# Patient Record
Sex: MALE | Race: WHITE | Employment: OTHER | URBAN - METROPOLITAN AREA
[De-identification: names, ages, dates, MRNs, and addresses within clinical notes are randomized per-mention and may not be internally consistent; named-entity substitution may affect disease eponyms.]

---

## 2018-06-27 ENCOUNTER — HOSPITAL ENCOUNTER (OUTPATIENT)
Age: 68
Discharge: HOME | End: 2018-06-27
Payer: MEDICARE

## 2018-06-27 LAB
ALBUMIN SERPL-MCNC: 4.2 G/DL (ref 3.5–5.2)
ALP BLD-CCNC: 60 U/L (ref 40–129)
ALT SERPL-CCNC: 57 U/L (ref 0–40)
ANION GAP SERPL CALCULATED.3IONS-SCNC: 13 MMOL/L (ref 7–16)
AST SERPL-CCNC: 35 U/L (ref 0–39)
BILIRUB SERPL-MCNC: 1 MG/DL (ref 0–1.2)
BUN BLDV-MCNC: 14 MG/DL (ref 8–23)
CALCIUM SERPL-MCNC: 9.2 MG/DL (ref 8.6–10.2)
CHLORIDE BLD-SCNC: 101 MMOL/L (ref 98–107)
CO2: 27 MMOL/L (ref 22–29)
CREAT SERPL-MCNC: 0.9 MG/DL (ref 0.7–1.2)
GFR AFRICAN AMERICAN: >60
GFR NON-AFRICAN AMERICAN: >60 ML/MIN/1.73
GLUCOSE FASTING: 139 MG/DL (ref 74–109)
HBA1C MFR BLD: 5.3 % (ref 4–5.6)
POTASSIUM SERPL-SCNC: 4.8 MMOL/L (ref 3.5–5)
PROSTATE SPECIFIC ANTIGEN: 1.47 NG/ML (ref 0–4)
SODIUM BLD-SCNC: 141 MMOL/L (ref 132–146)
TOTAL PROTEIN: 6.8 G/DL (ref 6.4–8.3)

## 2018-06-27 PROCEDURE — 83036 HEMOGLOBIN GLYCOSYLATED A1C: CPT

## 2018-06-27 PROCEDURE — 36415 COLL VENOUS BLD VENIPUNCTURE: CPT

## 2018-06-27 PROCEDURE — G0103 PSA SCREENING: HCPCS

## 2018-06-27 PROCEDURE — 80053 COMPREHEN METABOLIC PANEL: CPT

## 2019-01-24 ENCOUNTER — HOSPITAL ENCOUNTER (OUTPATIENT)
Age: 69
Discharge: HOME | End: 2019-01-24
Payer: MEDICARE

## 2019-01-24 LAB
ALBUMIN SERPL-MCNC: 4.4 G/DL (ref 3.5–5.2)
ALP BLD-CCNC: 67 U/L (ref 40–129)
ALT SERPL-CCNC: 51 U/L (ref 0–40)
ANION GAP SERPL CALCULATED.3IONS-SCNC: 11 MMOL/L (ref 7–16)
AST SERPL-CCNC: 42 U/L (ref 0–39)
BASOPHILS ABSOLUTE: 0.04 E9/L (ref 0–0.2)
BASOPHILS RELATIVE PERCENT: 0.5 % (ref 0–2)
BILIRUB SERPL-MCNC: 1.2 MG/DL (ref 0–1.2)
BUN BLDV-MCNC: 20 MG/DL (ref 8–23)
CALCIUM SERPL-MCNC: 9.1 MG/DL (ref 8.6–10.2)
CHLORIDE BLD-SCNC: 103 MMOL/L (ref 98–107)
CHOLESTEROL, FASTING: 124 MG/DL (ref 0–199)
CO2: 27 MMOL/L (ref 22–29)
CREAT SERPL-MCNC: 0.9 MG/DL (ref 0.7–1.2)
EOSINOPHILS ABSOLUTE: 0.3 E9/L (ref 0.05–0.5)
EOSINOPHILS RELATIVE PERCENT: 4 % (ref 0–6)
GFR AFRICAN AMERICAN: >60
GFR NON-AFRICAN AMERICAN: >60 ML/MIN/1.73
GLUCOSE FASTING: 144 MG/DL (ref 74–99)
HBA1C MFR BLD: 5.3 % (ref 4–5.6)
HCT VFR BLD CALC: 44.5 % (ref 37–54)
HDLC SERPL-MCNC: 32 MG/DL
HEMOGLOBIN: 15.1 G/DL (ref 12.5–16.5)
IMMATURE GRANULOCYTES #: 0.03 E9/L
IMMATURE GRANULOCYTES %: 0.4 % (ref 0–5)
LDL CHOLESTEROL CALCULATED: 67 MG/DL (ref 0–99)
LYMPHOCYTES ABSOLUTE: 1.37 E9/L (ref 1.5–4)
LYMPHOCYTES RELATIVE PERCENT: 18.1 % (ref 20–42)
MCH RBC QN AUTO: 30.7 PG (ref 26–35)
MCHC RBC AUTO-ENTMCNC: 33.9 % (ref 32–34.5)
MCV RBC AUTO: 90.4 FL (ref 80–99.9)
MONOCYTES ABSOLUTE: 0.61 E9/L (ref 0.1–0.95)
MONOCYTES RELATIVE PERCENT: 8 % (ref 2–12)
NEUTROPHILS ABSOLUTE: 5.24 E9/L (ref 1.8–7.3)
NEUTROPHILS RELATIVE PERCENT: 69 % (ref 43–80)
PDW BLD-RTO: 12.6 FL (ref 11.5–15)
PLATELET # BLD: 220 E9/L (ref 130–450)
PMV BLD AUTO: 10.1 FL (ref 7–12)
POTASSIUM SERPL-SCNC: 5.1 MMOL/L (ref 3.5–5)
RBC # BLD: 4.92 E12/L (ref 3.8–5.8)
SODIUM BLD-SCNC: 141 MMOL/L (ref 132–146)
TOTAL PROTEIN: 7.3 G/DL (ref 6.4–8.3)
TRIGLYCERIDE, FASTING: 124 MG/DL (ref 0–149)
TSH SERPL DL<=0.05 MIU/L-ACNC: 1.64 UIU/ML (ref 0.27–4.2)
VLDLC SERPL CALC-MCNC: 25 MG/DL
WBC # BLD: 7.6 E9/L (ref 4.5–11.5)

## 2019-01-24 PROCEDURE — 83036 HEMOGLOBIN GLYCOSYLATED A1C: CPT

## 2019-01-24 PROCEDURE — 80053 COMPREHEN METABOLIC PANEL: CPT

## 2019-01-24 PROCEDURE — 84443 ASSAY THYROID STIM HORMONE: CPT

## 2019-01-24 PROCEDURE — 36415 COLL VENOUS BLD VENIPUNCTURE: CPT

## 2019-01-24 PROCEDURE — 80061 LIPID PANEL: CPT

## 2019-01-24 PROCEDURE — 85025 COMPLETE CBC W/AUTO DIFF WBC: CPT

## 2019-05-07 ENCOUNTER — HOSPITAL ENCOUNTER (EMERGENCY)
Age: 69
Discharge: HOME | End: 2019-05-07
Payer: MEDICARE

## 2019-05-07 VITALS
DIASTOLIC BLOOD PRESSURE: 76 MMHG | BODY MASS INDEX: 31.15 KG/M2 | TEMPERATURE: 98.1 F | WEIGHT: 230 LBS | RESPIRATION RATE: 16 BRPM | OXYGEN SATURATION: 99 % | SYSTOLIC BLOOD PRESSURE: 160 MMHG | HEIGHT: 72 IN | HEART RATE: 71 BPM

## 2019-05-07 DIAGNOSIS — H10.9 CONJUNCTIVITIS OF BOTH EYES, UNSPECIFIED CONJUNCTIVITIS TYPE: Primary | ICD-10-CM

## 2019-05-07 PROCEDURE — 99282 EMERGENCY DEPT VISIT SF MDM: CPT

## 2019-05-07 RX ORDER — POLYMYXIN B SULFATE AND TRIMETHOPRIM 1; 10000 MG/ML; [USP'U]/ML
1 SOLUTION OPHTHALMIC EVERY 4 HOURS
Qty: 1 BOTTLE | Refills: 0 | Status: SHIPPED | OUTPATIENT
Start: 2019-05-07 | End: 2019-05-17

## 2019-05-07 NOTE — ED PROVIDER NOTES
[facility]       Department of Emergency Medicine   ED  Provider Note  Admit Date/RoomTime: 5/7/2019  5:04 PM  ED Room: [place] [address]  Chief Complaint:   Eye Problem (drainage from bilateral eyes today)    History of Present Illness   Source of history provided by:  patient. History/Exam Limitations: none. Sergei Bonilla is a 77 y.o. old male presenting to the emergency department by private vehicle, with gradual onset discharge and erythema to both eyes, which began 10 hour(s) prior to arrival.  Since onset his symptoms have been persistent and mild in severity. Associated signs & symptoms of:  none. The patients tetanus status is unknown. Mechanism:     []  FB Exposure     []  Chemical Exposure     []  Direct Trauma     []  High Speed Machinery Use     []  Welding      Circumstances:    []  Contact Lens Use     []  Recent URI Sx's     []  Spontaneous Onset     []  Close Contact w/similar Sx's     []  Work Related     History of:     []   Glaucoma     []   Recent Eye Surgery     ROS    Pertinent positives and negatives are stated within HPI, all other systems reviewed and are negative. Past Surgical History:  has a past surgical history that includes Cardiac surgery. Social History:  reports that he has never smoked. He has never used smokeless tobacco. He reports that he drinks alcohol. He reports that he does not use drugs. Family History: family history is not on file. Allergies: Patient has no known allergies. Physical Exam           ED Triage Vitals [05/07/19 1703]   BP Temp Temp src Pulse Resp SpO2 Height Weight   (!) 160/76 98.1 °F (36.7 °C) -- 71 16 99 % 6' (1.829 m) 230 lb (104.3 kg)      Oxygen Saturation Interpretation: Normal.    Constitutional:  Alert, development consistent with age. HENT:  NC/NT. Airway patent. Neck:  Normal ROM. Supple. Eyes:         Pupils: equal, round, reactive to light and accommodation.        Eyelids: Bilateral upper and lower Swelling/redness: None.  Scant green thick drainage bilateral eyes. Conjunctiva: Bilateral mild erythema. Sclera: Bilateral mild erythema. Cornea: Bilateral normal and no abnormalities were observed. EOM:  Intact Bilaterally. Fundoscopic:  not well visualized. Visual Acuity:  Within Normal Limit. Integument:  No rashes, erythema present, unless noted elsewhere. Lymphatics: No lymphangitis or adenopathy noted. Neurological:  Oriented. Motor functions intact. Lab / Imaging Results   (All laboratory and radiology results have been personally reviewed by myself)  Labs:  No results found for this visit on 05/07/19. Imaging: All Radiology results interpreted by Radiologist unless otherwise noted. No orders to display       ED Course / Medical Decision Making   Medications - No data to display     MDM:   Patient presented with bilateral erythema and scant green drainage. Patient had no exposure to foreign bodies. He denied pain. His clinical appearance of conjunctivitis and will be initiated on Polytrim. He is instructed to take his medication as prescribed and follow-up with his primary caregiver. Instructed to return to emergency department with any new or worsening symptoms. Counseling: The emergency provider has spoken with the patient and discussed todays results, in addition to providing specific details for the plan of care and counseling regarding the diagnosis and prognosis. Questions are answered at this time and they are agreeable with the plan. Assessment      1.  Conjunctivitis of both eyes, unspecified conjunctivitis type      Plan   Discharge to home  Patient condition is good    New Medications     New Prescriptions    TRIMETHOPRIM-POLYMYXIN B (POLYTRIM) 53873-9.1 UNIT/ML-% OPHTHALMIC SOLUTION    Place 1 drop into both eyes every 4 hours for 10 days     Electronically signed by CHIOMA Cervantes CNP   DD: 5/7/19  **This report was transcribed using voice recognition software. Every effort was made to ensure accuracy; however, inadvertent computerized transcription errors may be present.   END OF ED PROVIDER NOTE       Bigg Patel, CHIOMA - CELIA  05/07/19 5039

## 2019-05-31 ENCOUNTER — HOSPITAL ENCOUNTER (OUTPATIENT)
Age: 69
Discharge: HOME | End: 2019-05-31
Payer: MEDICARE

## 2019-05-31 LAB
ALBUMIN SERPL-MCNC: 4 G/DL (ref 3.5–5.2)
ALP BLD-CCNC: 62 U/L (ref 40–129)
ALT SERPL-CCNC: 35 U/L (ref 0–40)
ANION GAP SERPL CALCULATED.3IONS-SCNC: 9 MMOL/L (ref 7–16)
AST SERPL-CCNC: 22 U/L (ref 0–39)
BASOPHILS ABSOLUTE: 0.05 E9/L (ref 0–0.2)
BASOPHILS RELATIVE PERCENT: 0.7 % (ref 0–2)
BILIRUB SERPL-MCNC: 1 MG/DL (ref 0–1.2)
BUN BLDV-MCNC: 15 MG/DL (ref 8–23)
CALCIUM SERPL-MCNC: 9.2 MG/DL (ref 8.6–10.2)
CHLORIDE BLD-SCNC: 104 MMOL/L (ref 98–107)
CO2: 28 MMOL/L (ref 22–29)
CREAT SERPL-MCNC: 0.9 MG/DL (ref 0.7–1.2)
EOSINOPHILS ABSOLUTE: 0.23 E9/L (ref 0.05–0.5)
EOSINOPHILS RELATIVE PERCENT: 3.2 % (ref 0–6)
GFR AFRICAN AMERICAN: >60
GFR NON-AFRICAN AMERICAN: >60 ML/MIN/1.73
GLUCOSE BLD-MCNC: 157 MG/DL (ref 74–99)
HBA1C MFR BLD: 5.7 % (ref 4–5.6)
HCT VFR BLD CALC: 42.9 % (ref 37–54)
HEMOGLOBIN: 14.7 G/DL (ref 12.5–16.5)
IMMATURE GRANULOCYTES #: 0.02 E9/L
IMMATURE GRANULOCYTES %: 0.3 % (ref 0–5)
LYMPHOCYTES ABSOLUTE: 1.05 E9/L (ref 1.5–4)
LYMPHOCYTES RELATIVE PERCENT: 14.4 % (ref 20–42)
MCH RBC QN AUTO: 30.9 PG (ref 26–35)
MCHC RBC AUTO-ENTMCNC: 34.3 % (ref 32–34.5)
MCV RBC AUTO: 90.3 FL (ref 80–99.9)
MONOCYTES ABSOLUTE: 0.59 E9/L (ref 0.1–0.95)
MONOCYTES RELATIVE PERCENT: 8.1 % (ref 2–12)
NEUTROPHILS ABSOLUTE: 5.36 E9/L (ref 1.8–7.3)
NEUTROPHILS RELATIVE PERCENT: 73.3 % (ref 43–80)
PDW BLD-RTO: 12 FL (ref 11.5–15)
PLATELET # BLD: 209 E9/L (ref 130–450)
PMV BLD AUTO: 10 FL (ref 7–12)
POTASSIUM SERPL-SCNC: 4.7 MMOL/L (ref 3.5–5)
RBC # BLD: 4.75 E12/L (ref 3.8–5.8)
SODIUM BLD-SCNC: 141 MMOL/L (ref 132–146)
TESTOSTERONE TOTAL: 404.2 NG/DL
TOTAL PROTEIN: 6.9 G/DL (ref 6.4–8.3)
WBC # BLD: 7.3 E9/L (ref 4.5–11.5)

## 2019-05-31 PROCEDURE — 80053 COMPREHEN METABOLIC PANEL: CPT

## 2019-05-31 PROCEDURE — 80074 ACUTE HEPATITIS PANEL: CPT

## 2019-05-31 PROCEDURE — 83036 HEMOGLOBIN GLYCOSYLATED A1C: CPT

## 2019-05-31 PROCEDURE — 85025 COMPLETE CBC W/AUTO DIFF WBC: CPT

## 2019-05-31 PROCEDURE — 36415 COLL VENOUS BLD VENIPUNCTURE: CPT

## 2019-05-31 PROCEDURE — 84403 ASSAY OF TOTAL TESTOSTERONE: CPT

## 2019-06-03 LAB
HAV IGM SER IA-ACNC: NORMAL
HEPATITIS B CORE IGM ANTIBODY: NORMAL
HEPATITIS B SURFACE ANTIGEN INTERPRETATION: NORMAL
HEPATITIS C ANTIBODY INTERPRETATION: NORMAL

## 2019-07-29 ENCOUNTER — HOSPITAL ENCOUNTER (OUTPATIENT)
Age: 69
Discharge: HOME | End: 2019-07-29
Payer: MEDICARE

## 2019-07-29 LAB
ALBUMIN SERPL-MCNC: 4.4 G/DL (ref 3.5–5.2)
ALP BLD-CCNC: 57 U/L (ref 40–129)
ALT SERPL-CCNC: 34 U/L (ref 0–40)
ANION GAP SERPL CALCULATED.3IONS-SCNC: 11 MMOL/L (ref 7–16)
AST SERPL-CCNC: 21 U/L (ref 0–39)
BASOPHILS ABSOLUTE: 0.05 E9/L (ref 0–0.2)
BASOPHILS RELATIVE PERCENT: 0.6 % (ref 0–2)
BILIRUB SERPL-MCNC: 1 MG/DL (ref 0–1.2)
BUN BLDV-MCNC: 17 MG/DL (ref 8–23)
CALCIUM SERPL-MCNC: 8.8 MG/DL (ref 8.6–10.2)
CHLORIDE BLD-SCNC: 102 MMOL/L (ref 98–107)
CO2: 26 MMOL/L (ref 22–29)
CREAT SERPL-MCNC: 0.8 MG/DL (ref 0.7–1.2)
EOSINOPHILS ABSOLUTE: 0.17 E9/L (ref 0.05–0.5)
EOSINOPHILS RELATIVE PERCENT: 2 % (ref 0–6)
GFR AFRICAN AMERICAN: >60
GFR NON-AFRICAN AMERICAN: >60 ML/MIN/1.73
GLUCOSE FASTING: 155 MG/DL (ref 74–99)
HBA1C MFR BLD: 5.5 % (ref 4–5.6)
HCT VFR BLD CALC: 43.9 % (ref 37–54)
HEMOGLOBIN: 14.9 G/DL (ref 12.5–16.5)
IMMATURE GRANULOCYTES #: 0.02 E9/L
IMMATURE GRANULOCYTES %: 0.2 % (ref 0–5)
LYMPHOCYTES ABSOLUTE: 1.38 E9/L (ref 1.5–4)
LYMPHOCYTES RELATIVE PERCENT: 16.5 % (ref 20–42)
MCH RBC QN AUTO: 30.7 PG (ref 26–35)
MCHC RBC AUTO-ENTMCNC: 33.9 % (ref 32–34.5)
MCV RBC AUTO: 90.5 FL (ref 80–99.9)
MONOCYTES ABSOLUTE: 0.71 E9/L (ref 0.1–0.95)
MONOCYTES RELATIVE PERCENT: 8.5 % (ref 2–12)
NEUTROPHILS ABSOLUTE: 6.05 E9/L (ref 1.8–7.3)
NEUTROPHILS RELATIVE PERCENT: 72.2 % (ref 43–80)
PDW BLD-RTO: 12.4 FL (ref 11.5–15)
PLATELET # BLD: 213 E9/L (ref 130–450)
PMV BLD AUTO: 10.4 FL (ref 7–12)
POTASSIUM SERPL-SCNC: 4.5 MMOL/L (ref 3.5–5)
PROSTATE SPECIFIC ANTIGEN: 0.86 NG/ML (ref 0–4)
RBC # BLD: 4.85 E12/L (ref 3.8–5.8)
SODIUM BLD-SCNC: 139 MMOL/L (ref 132–146)
TOTAL PROTEIN: 6.8 G/DL (ref 6.4–8.3)
WBC # BLD: 8.4 E9/L (ref 4.5–11.5)

## 2019-07-29 PROCEDURE — 36415 COLL VENOUS BLD VENIPUNCTURE: CPT

## 2019-07-29 PROCEDURE — 85025 COMPLETE CBC W/AUTO DIFF WBC: CPT

## 2019-07-29 PROCEDURE — 83036 HEMOGLOBIN GLYCOSYLATED A1C: CPT

## 2019-07-29 PROCEDURE — 84153 ASSAY OF PSA TOTAL: CPT

## 2019-07-29 PROCEDURE — G0103 PSA SCREENING: HCPCS

## 2019-07-29 PROCEDURE — 80053 COMPREHEN METABOLIC PANEL: CPT

## 2020-09-16 ENCOUNTER — HOSPITAL ENCOUNTER (OUTPATIENT)
Age: 70
Discharge: HOME | End: 2020-09-16
Payer: MEDICARE

## 2020-09-16 LAB
ALBUMIN SERPL-MCNC: 4.2 G/DL (ref 3.5–5.2)
ALP BLD-CCNC: 62 U/L (ref 40–129)
ALT SERPL-CCNC: 51 U/L (ref 0–40)
ANION GAP SERPL CALCULATED.3IONS-SCNC: 10 MMOL/L (ref 7–16)
AST SERPL-CCNC: 32 U/L (ref 0–39)
BACTERIA: NORMAL /HPF
BASOPHILS ABSOLUTE: 0.05 E9/L (ref 0–0.2)
BASOPHILS RELATIVE PERCENT: 0.5 % (ref 0–2)
BILIRUB SERPL-MCNC: 1 MG/DL (ref 0–1.2)
BILIRUBIN URINE: NEGATIVE
BLOOD, URINE: ABNORMAL
BUN BLDV-MCNC: 19 MG/DL (ref 8–23)
CALCIUM SERPL-MCNC: 9.4 MG/DL (ref 8.6–10.2)
CHLORIDE BLD-SCNC: 99 MMOL/L (ref 98–107)
CHOLESTEROL, TOTAL: 149 MG/DL (ref 0–199)
CLARITY: CLEAR
CO2: 28 MMOL/L (ref 22–29)
COLOR: YELLOW
CREAT SERPL-MCNC: 1 MG/DL (ref 0.7–1.2)
CREATININE URINE: 251 MG/DL (ref 40–278)
EOSINOPHILS ABSOLUTE: 0.19 E9/L (ref 0.05–0.5)
EOSINOPHILS RELATIVE PERCENT: 1.8 % (ref 0–6)
GFR AFRICAN AMERICAN: >60
GFR NON-AFRICAN AMERICAN: >60 ML/MIN/1.73
GLUCOSE BLD-MCNC: 163 MG/DL (ref 74–99)
GLUCOSE URINE: NEGATIVE MG/DL
HBA1C MFR BLD: 6.4 % (ref 4–5.6)
HCT VFR BLD CALC: 47.1 % (ref 37–54)
HDLC SERPL-MCNC: 45 MG/DL
HEMOGLOBIN: 15.6 G/DL (ref 12.5–16.5)
IMMATURE GRANULOCYTES #: 0.06 E9/L
IMMATURE GRANULOCYTES %: 0.6 % (ref 0–5)
KETONES, URINE: NEGATIVE MG/DL
LDL CHOLESTEROL CALCULATED: 84 MG/DL (ref 0–99)
LEUKOCYTE ESTERASE, URINE: NEGATIVE
LYMPHOCYTES ABSOLUTE: 1.52 E9/L (ref 1.5–4)
LYMPHOCYTES RELATIVE PERCENT: 14.6 % (ref 20–42)
MCH RBC QN AUTO: 30.8 PG (ref 26–35)
MCHC RBC AUTO-ENTMCNC: 33.1 % (ref 32–34.5)
MCV RBC AUTO: 92.9 FL (ref 80–99.9)
MICROALBUMIN UR-MCNC: <12 MG/L
MICROALBUMIN/CREAT UR-RTO: ABNORMAL (ref 0–30)
MONOCYTES ABSOLUTE: 0.62 E9/L (ref 0.1–0.95)
MONOCYTES RELATIVE PERCENT: 6 % (ref 2–12)
NEUTROPHILS ABSOLUTE: 7.96 E9/L (ref 1.8–7.3)
NEUTROPHILS RELATIVE PERCENT: 76.5 % (ref 43–80)
NITRITE, URINE: NEGATIVE
PDW BLD-RTO: 12.1 FL (ref 11.5–15)
PH UA: 6 (ref 5–9)
PLATELET # BLD: 220 E9/L (ref 130–450)
PMV BLD AUTO: 10.3 FL (ref 7–12)
POTASSIUM SERPL-SCNC: 4.8 MMOL/L (ref 3.5–5)
PROSTATE SPECIFIC ANTIGEN: 1.09 NG/ML (ref 0–4)
PROTEIN UA: NEGATIVE MG/DL
RBC # BLD: 5.07 E12/L (ref 3.8–5.8)
RBC UA: NORMAL /HPF (ref 0–2)
SODIUM BLD-SCNC: 137 MMOL/L (ref 132–146)
SPECIFIC GRAVITY UA: 1.02 (ref 1–1.03)
TOTAL PROTEIN: 6.8 G/DL (ref 6.4–8.3)
TRIGL SERPL-MCNC: 101 MG/DL (ref 0–149)
UROBILINOGEN, URINE: 0.2 E.U./DL
VLDLC SERPL CALC-MCNC: 20 MG/DL
WBC # BLD: 10.4 E9/L (ref 4.5–11.5)
WBC UA: NORMAL /HPF (ref 0–5)

## 2020-09-16 PROCEDURE — 83036 HEMOGLOBIN GLYCOSYLATED A1C: CPT

## 2020-09-16 PROCEDURE — 82570 ASSAY OF URINE CREATININE: CPT

## 2020-09-16 PROCEDURE — G0103 PSA SCREENING: HCPCS

## 2020-09-16 PROCEDURE — 80061 LIPID PANEL: CPT

## 2020-09-16 PROCEDURE — 81001 URINALYSIS AUTO W/SCOPE: CPT

## 2020-09-16 PROCEDURE — 36415 COLL VENOUS BLD VENIPUNCTURE: CPT

## 2020-09-16 PROCEDURE — 82044 UR ALBUMIN SEMIQUANTITATIVE: CPT

## 2020-09-16 PROCEDURE — 80053 COMPREHEN METABOLIC PANEL: CPT

## 2020-09-16 PROCEDURE — 84153 ASSAY OF PSA TOTAL: CPT

## 2020-09-16 PROCEDURE — 85025 COMPLETE CBC W/AUTO DIFF WBC: CPT

## 2021-11-03 ENCOUNTER — APPOINTMENT (OUTPATIENT)
Dept: CT IMAGING | Age: 71
End: 2021-11-03
Payer: MEDICARE

## 2021-11-03 ENCOUNTER — HOSPITAL ENCOUNTER (EMERGENCY)
Age: 71
Discharge: HOME | End: 2021-11-03
Attending: EMERGENCY MEDICINE
Payer: MEDICARE

## 2021-11-03 VITALS
TEMPERATURE: 98.1 F | HEIGHT: 72 IN | BODY MASS INDEX: 29.53 KG/M2 | HEART RATE: 65 BPM | OXYGEN SATURATION: 100 % | SYSTOLIC BLOOD PRESSURE: 179 MMHG | RESPIRATION RATE: 18 BRPM | DIASTOLIC BLOOD PRESSURE: 92 MMHG | WEIGHT: 218 LBS

## 2021-11-03 DIAGNOSIS — R42 ORTHOSTATIC LIGHTHEADEDNESS: Primary | ICD-10-CM

## 2021-11-03 DIAGNOSIS — I10 ESSENTIAL HYPERTENSION: ICD-10-CM

## 2021-11-03 LAB
ANION GAP SERPL CALCULATED.3IONS-SCNC: 12 MMOL/L (ref 7–16)
BUN BLDV-MCNC: 13 MG/DL (ref 6–23)
CALCIUM SERPL-MCNC: 10.2 MG/DL (ref 8.6–10.2)
CHLORIDE BLD-SCNC: 101 MMOL/L (ref 98–107)
CO2: 27 MMOL/L (ref 22–29)
CREAT SERPL-MCNC: 0.7 MG/DL (ref 0.7–1.2)
EKG ATRIAL RATE: 70 BPM
EKG P AXIS: 65 DEGREES
EKG P-R INTERVAL: 186 MS
EKG Q-T INTERVAL: 430 MS
EKG QRS DURATION: 88 MS
EKG QTC CALCULATION (BAZETT): 464 MS
EKG R AXIS: 4 DEGREES
EKG T AXIS: 70 DEGREES
EKG VENTRICULAR RATE: 70 BPM
GFR AFRICAN AMERICAN: >60
GFR NON-AFRICAN AMERICAN: >60 ML/MIN/1.73
GLUCOSE BLD-MCNC: 128 MG/DL (ref 74–99)
HCT VFR BLD CALC: 39.8 % (ref 37–54)
HEMOGLOBIN: 14.1 G/DL (ref 12.5–16.5)
MAGNESIUM: 2 MG/DL (ref 1.6–2.6)
MCH RBC QN AUTO: 32.9 PG (ref 26–35)
MCHC RBC AUTO-ENTMCNC: 35.4 % (ref 32–34.5)
MCV RBC AUTO: 92.8 FL (ref 80–99.9)
PDW BLD-RTO: 12.2 FL (ref 11.5–15)
PLATELET # BLD: 106 E9/L (ref 130–450)
PMV BLD AUTO: 11.8 FL (ref 7–12)
POTASSIUM SERPL-SCNC: 4.7 MMOL/L (ref 3.5–5)
RBC # BLD: 4.29 E12/L (ref 3.8–5.8)
SODIUM BLD-SCNC: 140 MMOL/L (ref 132–146)
TROPONIN, HIGH SENSITIVITY: 13 NG/L (ref 0–11)
TROPONIN, HIGH SENSITIVITY: 16 NG/L (ref 0–11)
WBC # BLD: 8.7 E9/L (ref 4.5–11.5)

## 2021-11-03 PROCEDURE — 85027 COMPLETE CBC AUTOMATED: CPT

## 2021-11-03 PROCEDURE — 84484 ASSAY OF TROPONIN QUANT: CPT

## 2021-11-03 PROCEDURE — 80048 BASIC METABOLIC PNL TOTAL CA: CPT

## 2021-11-03 PROCEDURE — 2500000003 HC RX 250 WO HCPCS: Performed by: EMERGENCY MEDICINE

## 2021-11-03 PROCEDURE — 83735 ASSAY OF MAGNESIUM: CPT

## 2021-11-03 PROCEDURE — 6370000000 HC RX 637 (ALT 250 FOR IP): Performed by: EMERGENCY MEDICINE

## 2021-11-03 PROCEDURE — 93005 ELECTROCARDIOGRAM TRACING: CPT | Performed by: EMERGENCY MEDICINE

## 2021-11-03 PROCEDURE — 96374 THER/PROPH/DIAG INJ IV PUSH: CPT

## 2021-11-03 PROCEDURE — 99285 EMERGENCY DEPT VISIT HI MDM: CPT

## 2021-11-03 PROCEDURE — 36415 COLL VENOUS BLD VENIPUNCTURE: CPT

## 2021-11-03 PROCEDURE — 2580000003 HC RX 258: Performed by: EMERGENCY MEDICINE

## 2021-11-03 PROCEDURE — 96376 TX/PRO/DX INJ SAME DRUG ADON: CPT

## 2021-11-03 PROCEDURE — 71045 X-RAY EXAM CHEST 1 VIEW: CPT

## 2021-11-03 RX ORDER — 0.9 % SODIUM CHLORIDE 0.9 %
1000 INTRAVENOUS SOLUTION INTRAVENOUS ONCE
Status: COMPLETED | OUTPATIENT
Start: 2021-11-03 | End: 2021-11-03

## 2021-11-03 RX ORDER — MORPHINE SULFATE 4 MG/ML
4 INJECTION, SOLUTION INTRAMUSCULAR; INTRAVENOUS ONCE
Status: DISCONTINUED | OUTPATIENT
Start: 2021-11-03 | End: 2021-11-03

## 2021-11-03 RX ORDER — LABETALOL HYDROCHLORIDE 5 MG/ML
10 INJECTION, SOLUTION INTRAVENOUS ONCE
Status: COMPLETED | OUTPATIENT
Start: 2021-11-03 | End: 2021-11-03

## 2021-11-03 RX ORDER — CLONIDINE HYDROCHLORIDE 0.1 MG/1
0.1 TABLET ORAL ONCE
Status: COMPLETED | OUTPATIENT
Start: 2021-11-03 | End: 2021-11-03

## 2021-11-03 RX ORDER — CARVEDILOL 12.5 MG/1
12.5 TABLET ORAL 2 TIMES DAILY WITH MEALS
COMMUNITY

## 2021-11-03 RX ADMIN — LABETALOL HYDROCHLORIDE 10 MG: 5 INJECTION INTRAVENOUS at 15:06

## 2021-11-03 RX ADMIN — LABETALOL HYDROCHLORIDE 10 MG: 5 INJECTION INTRAVENOUS at 14:19

## 2021-11-03 RX ADMIN — CLONIDINE HYDROCHLORIDE 0.1 MG: 0.1 TABLET ORAL at 16:39

## 2021-11-03 RX ADMIN — SODIUM CHLORIDE 1000 ML: 9 INJECTION, SOLUTION INTRAVENOUS at 14:20

## 2021-11-03 ASSESSMENT — PAIN DESCRIPTION - DIRECTION: RADIATING_TOWARDS: HIP

## 2021-11-03 ASSESSMENT — PAIN DESCRIPTION - PAIN TYPE
TYPE: ACUTE PAIN
TYPE: ACUTE PAIN

## 2021-11-03 ASSESSMENT — PAIN DESCRIPTION - LOCATION
LOCATION: BACK
LOCATION: BACK

## 2021-11-03 ASSESSMENT — ENCOUNTER SYMPTOMS
SHORTNESS OF BREATH: 0
BACK PAIN: 1
CHEST TIGHTNESS: 0

## 2021-11-03 ASSESSMENT — PAIN SCALES - GENERAL
PAINLEVEL_OUTOF10: 10
PAINLEVEL_OUTOF10: 0
PAINLEVEL_OUTOF10: 3

## 2021-11-03 ASSESSMENT — PAIN DESCRIPTION - ORIENTATION: ORIENTATION: RIGHT

## 2021-11-03 NOTE — ED PROVIDER NOTES
64-year-old male presenting with concern about lightheadedness when he stands up. He has known high blood pressure and is always struggled to control his blood pressure. 1 week ago he took 100 mg sildenafil. He had very little lightheadedness at that time. Then on Sunday he took 50 mg of sildenafil and since then he said he has been more lightheaded. He does not take blood thinning medication otherwise, is not on any nitroglycerin otherwise. He is on 3 blood pressure medications. This is somewhat gradual onset, persistent, moderate severity, several days duration, he is associating it with the sildenafil usage. Patient also has known right-sided sciatica is being in treatment for for the last several months. This been bothering him as well and thinks that might be part of why his blood pressure is up. History reviewed. No pertinent family history. Past Surgical History:   Procedure Laterality Date    CARDIAC SURGERY         Review of Systems   Constitutional: Negative for fever. Respiratory: Negative for chest tightness and shortness of breath. Musculoskeletal: Positive for back pain. Neurological: Positive for light-headedness. All other systems reviewed and are negative. Physical Exam  Constitutional:       General: He is not in acute distress. Appearance: He is well-developed. HENT:      Head: Normocephalic and atraumatic. Eyes:      Pupils: Pupils are equal, round, and reactive to light. Neck:      Thyroid: No thyromegaly. Cardiovascular:      Rate and Rhythm: Normal rate and regular rhythm. Pulmonary:      Effort: Pulmonary effort is normal. No respiratory distress. Breath sounds: Normal breath sounds. No wheezing. Abdominal:      General: There is no distension. Palpations: Abdomen is soft. There is no mass. Tenderness: There is no abdominal tenderness. There is no guarding or rebound. Musculoskeletal:         General: No tenderness.  Normal range of motion. Cervical back: Normal range of motion and neck supple. Skin:     General: Skin is warm and dry. Findings: No erythema. Neurological:      Mental Status: He is alert and oriented to person, place, and time. Cranial Nerves: No cranial nerve deficit. Procedures     MDM     ED Course as of 11/06/21 1538 Wed Nov 03, 2021   1509 Patient blood pressure still elevated. Will give another dose of labetalol. [SO]   1546 Repeat blood pressure about 195/100 at this point. He is feeling well, is able to get up and walk around. He understands to follow-up with family doctor, cardiologist as well as he needs adjustments of his blood pressure medications. He says that he is under a lot of stress, he has normally good blood pressure readings at home. [SO]      ED Course User Index  [SO] Sandra Scott, DO           ED Course as of 11/06/21 1538 Wed Nov 03, 2021   1509 Patient blood pressure still elevated. Will give another dose of labetalol. [SO]   1546 Repeat blood pressure about 195/100 at this point. He is feeling well, is able to get up and walk around. He understands to follow-up with family doctor, cardiologist as well as he needs adjustments of his blood pressure medications. He says that he is under a lot of stress, he has normally good blood pressure readings at home. [SO]      ED Course User Index  [SO] Sandra Scott, DO       --------------------------------------------- PAST HISTORY ---------------------------------------------  Past Medical History:  has a past medical history of Hyperlipidemia and Hypertension. Past Surgical History:  has a past surgical history that includes Cardiac surgery. Social History:  reports that he has never smoked. He has never used smokeless tobacco. He reports current alcohol use. He reports that he does not use drugs. Family History: family history is not on file.      The patients home medications have been reviewed. Allergies: Patient has no known allergies. -------------------------------------------------- RESULTS -------------------------------------------------  Labs:  Results for orders placed or performed during the hospital encounter of 11/03/21   CBC   Result Value Ref Range    WBC 8.7 4.5 - 11.5 E9/L    RBC 4.29 3.80 - 5.80 E12/L    Hemoglobin 14.1 12.5 - 16.5 g/dL    Hematocrit 39.8 37.0 - 54.0 %    MCV 92.8 80.0 - 99.9 fL    MCH 32.9 26.0 - 35.0 pg    MCHC 35.4 (H) 32.0 - 34.5 %    RDW 12.2 11.5 - 15.0 fL    Platelets 883 (L) 049 - 450 E9/L    MPV 11.8 7.0 - 12.0 fL   Basic metabolic panel   Result Value Ref Range    Sodium 140 132 - 146 mmol/L    Potassium 4.7 3.5 - 5.0 mmol/L    Chloride 101 98 - 107 mmol/L    CO2 27 22 - 29 mmol/L    Anion Gap 12 7 - 16 mmol/L    Glucose 128 (H) 74 - 99 mg/dL    BUN 13 6 - 23 mg/dL    CREATININE 0.7 0.7 - 1.2 mg/dL    GFR Non-African American >60 >=60 mL/min/1.73    GFR African American >60     Calcium 10.2 8.6 - 10.2 mg/dL   Troponin   Result Value Ref Range    Troponin, High Sensitivity 13 (H) 0 - 11 ng/L   Magnesium   Result Value Ref Range    Magnesium 2.0 1.6 - 2.6 mg/dL   Troponin   Result Value Ref Range    Troponin, High Sensitivity 16 (H) 0 - 11 ng/L   EKG 12 Lead   Result Value Ref Range    Ventricular Rate 70 BPM    Atrial Rate 70 BPM    P-R Interval 186 ms    QRS Duration 88 ms    Q-T Interval 430 ms    QTc Calculation (Bazett) 464 ms    P Axis 65 degrees    R Axis 4 degrees    T Axis 70 degrees       Radiology:  XR CHEST PORTABLE   Final Result   No acute process. ------------------------- NURSING NOTES AND VITALS REVIEWED ---------------------------  Date / Time Roomed:  11/3/2021 12:10 PM  ED Bed Assignment:  11/11    The nursing notes within the ED encounter and vital signs as below have been reviewed.    BP (!) 179/92   Pulse 65   Temp 98.1 °F (36.7 °C) (Temporal)   Resp 18   Ht 6' (1.829 m)   Wt 218 lb (98.9 kg)   SpO2 100%   BMI 29.57 kg/m²   Oxygen Saturation Interpretation: Normal      ------------------------------------------ PROGRESS NOTES ------------------------------------------  I have spoken with the patient and discussed todays results, in addition to providing specific details for the plan of care and counseling regarding the diagnosis and prognosis. Their questions are answered at this time and they are agreeable with the plan. I discussed at length with them reasons for immediate return here for re evaluation. They will followup with primary care by calling their office tomorrow. --------------------------------- ADDITIONAL PROVIDER NOTES ---------------------------------  At this time the patient is without objective evidence of an acute process requiring hospitalization or inpatient management. They have remained hemodynamically stable throughout their entire ED visit and are stable for discharge with outpatient follow-up. The plan has been discussed in detail and they are aware of the specific conditions for emergent return, as well as the importance of follow-up. Discharge Medication List as of 11/3/2021  3:48 PM          Diagnosis:  1. Orthostatic lightheadedness    2. Essential hypertension        Disposition:  Patient's disposition: Discharge to home  Patient's condition is stable.          Lalla Osgood, DO  11/06/21 1538

## 2021-11-03 NOTE — ED NOTES
Recent v/s confirmed with MD before d/c process.   Pt received discharge instructions informed to call his cardiologist and primary care physician for follow up appointments     Adolph Sue RN  11/03/21 8991

## 2022-06-13 ENCOUNTER — OFFICE VISIT (OUTPATIENT)
Dept: FAMILY MEDICINE CLINIC | Age: 72
End: 2022-06-13
Payer: MEDICARE

## 2022-06-13 VITALS
HEART RATE: 64 BPM | OXYGEN SATURATION: 98 % | DIASTOLIC BLOOD PRESSURE: 83 MMHG | SYSTOLIC BLOOD PRESSURE: 154 MMHG | RESPIRATION RATE: 17 BRPM | BODY MASS INDEX: 29.53 KG/M2 | HEIGHT: 72 IN | WEIGHT: 218 LBS | TEMPERATURE: 97.4 F

## 2022-06-13 DIAGNOSIS — H66.003 NON-RECURRENT ACUTE SUPPURATIVE OTITIS MEDIA OF BOTH EARS WITHOUT SPONTANEOUS RUPTURE OF TYMPANIC MEMBRANES: Primary | ICD-10-CM

## 2022-06-13 DIAGNOSIS — H61.23 IMPACTED CERUMEN OF BOTH EARS: ICD-10-CM

## 2022-06-13 PROCEDURE — 3017F COLORECTAL CA SCREEN DOC REV: CPT | Performed by: NURSE PRACTITIONER

## 2022-06-13 PROCEDURE — 69210 REMOVE IMPACTED EAR WAX UNI: CPT | Performed by: NURSE PRACTITIONER

## 2022-06-13 PROCEDURE — 99213 OFFICE O/P EST LOW 20 MIN: CPT | Performed by: NURSE PRACTITIONER

## 2022-06-13 PROCEDURE — 1123F ACP DISCUSS/DSCN MKR DOCD: CPT | Performed by: NURSE PRACTITIONER

## 2022-06-13 PROCEDURE — G8427 DOCREV CUR MEDS BY ELIG CLIN: HCPCS | Performed by: NURSE PRACTITIONER

## 2022-06-13 PROCEDURE — 1036F TOBACCO NON-USER: CPT | Performed by: NURSE PRACTITIONER

## 2022-06-13 PROCEDURE — G8417 CALC BMI ABV UP PARAM F/U: HCPCS | Performed by: NURSE PRACTITIONER

## 2022-06-13 RX ORDER — BACITRACIN, NEOMYCIN, POLYMYXIN B 400; 3.5; 5 [USP'U]/G; MG/G; [USP'U]/G
OINTMENT TOPICAL
Status: CANCELLED | OUTPATIENT
Start: 2022-06-13 | End: 2022-06-23

## 2022-06-13 RX ORDER — OFLOXACIN 3 MG/ML
5 SOLUTION AURICULAR (OTIC) 2 TIMES DAILY
Qty: 10 ML | Refills: 0 | Status: SHIPPED | OUTPATIENT
Start: 2022-06-13 | End: 2022-06-16

## 2022-06-13 SDOH — ECONOMIC STABILITY: FOOD INSECURITY: WITHIN THE PAST 12 MONTHS, YOU WORRIED THAT YOUR FOOD WOULD RUN OUT BEFORE YOU GOT MONEY TO BUY MORE.: NEVER TRUE

## 2022-06-13 SDOH — ECONOMIC STABILITY: FOOD INSECURITY: WITHIN THE PAST 12 MONTHS, THE FOOD YOU BOUGHT JUST DIDN'T LAST AND YOU DIDN'T HAVE MONEY TO GET MORE.: NEVER TRUE

## 2022-06-13 ASSESSMENT — PATIENT HEALTH QUESTIONNAIRE - PHQ9
SUM OF ALL RESPONSES TO PHQ QUESTIONS 1-9: 0
SUM OF ALL RESPONSES TO PHQ QUESTIONS 1-9: 0
2. FEELING DOWN, DEPRESSED OR HOPELESS: 0
SUM OF ALL RESPONSES TO PHQ QUESTIONS 1-9: 0
SUM OF ALL RESPONSES TO PHQ QUESTIONS 1-9: 0
1. LITTLE INTEREST OR PLEASURE IN DOING THINGS: 0
SUM OF ALL RESPONSES TO PHQ9 QUESTIONS 1 & 2: 0

## 2022-06-13 ASSESSMENT — SOCIAL DETERMINANTS OF HEALTH (SDOH): HOW HARD IS IT FOR YOU TO PAY FOR THE VERY BASICS LIKE FOOD, HOUSING, MEDICAL CARE, AND HEATING?: NOT HARD AT ALL

## 2022-06-13 NOTE — PROGRESS NOTES
Chief Complaint       Ear Fullness (left ear full cant hear for a few days)    History of Present Illness   Source of history provided by:  patient. Margene Hashimoto is a 74 y.o. old male presenting to the walk in clinic for evaluation of left ear pain and pressure x 3-4 days. Denies associated nasal congestion, rhinorrhea, and sore throat. Denies any discharge from the ear canal. Has tried taking wax removal OTC without relief. Denies any fever, chills, CP, SOB, abdominal pain, neck stiffness, rash, or lethargy. Denies any contact with any individuals with known COVID-19 infection or under investigation for COVID-19 infection. Patient is vaccinated for COVID-19. ROS    Unless otherwise stated in this report or unable to obtain because of the patient's clinical or mental status as evidenced by the medical record, this patients's positive and negative responses for Review of Systems, constitutional, psych, eyes, ENT, cardiovascular, respiratory, gastrointestinal, neurological, genitourinary, musculoskeletal, integument systems and systems related to the presenting problem are either stated in the preceding or were not pertinent or were negative for the symptoms and/or complaints related to the medical problem. Past Medical History:  has a past medical history of Hyperlipidemia and Hypertension. Past Surgical History:  has a past surgical history that includes Cardiac surgery. Social History:  reports that he has never smoked. He has never used smokeless tobacco. He reports current alcohol use. He reports that he does not use drugs. Family History: family history is not on file. Allergies: Patient has no known allergies.     Physical Exam         VS:  BP (!) 154/83   Pulse 64   Temp 97.4 °F (36.3 °C) (Temporal)   Resp 17   Ht 6' (1.829 m)   Wt 218 lb (98.9 kg)   SpO2 98%   BMI 29.57 kg/m²    Oxygen Saturation Interpretation: Normal.    Constitutional:  Alert, development consistent with age.  Ears:  External Ears: Normal pinna bilaterally. TM's & External Canals: impacted cerumen bilaterally; following irrigation canals clear, TMs intact and pearly gray, nasal turbinates erythematous and boggy, pharynx shows erythema and post nasal drip. Airway patient. Neck:  Normal ROM. Supple. No adenopathy. Respiratory:  CTAB without wheezing, rales, or rhonchi  CV: Regular rate and rhythm, normal heart sounds, without pathological murmurs, ectopy, gallops, or rubs. Skin:  Moist and warm without rashes or lesions. Lymphatic: No lymphangitis or adenopathy noted. Neurological:  Oriented. Motor functions intact. Lab / Imaging Results   (All laboratory and radiology results have been personally reviewed by myself)  Labs:  No results found for this visit on 06/13/22. Assessment / Plan     Impression(s):  Allean Epley was seen today for ear fullness. Diagnoses and all orders for this visit:    Non-recurrent acute suppurative otitis media of both ears without spontaneous rupture of tympanic membranes  -     Ear wax removal  -     ofloxacin (FLOXIN) 0.3 % otic solution; Place 5 drops into both ears 2 times daily for 3 days  - Ceruminosis is noted. Wax is removed by syringing and manual debridement. Copious amounts removed, pt tolerated procedure well. TM's visualized, no perforation noted. Instructions for home care to prevent wax buildup are given. Disposition:  Disposition: Discharge to home. Script written, side effects discussed. Increase fluids and rest. Additional symptomatic relief discussed. F/u PCP  if symptoms persist. ED sooner if symptoms worsen or change. ED immediately with fever, severe/worsening ear pain, mastoid redness/tenderness, neck stiffness, CP, dyspnea, or dysphagia. Pt is in agreement with this care plan. All questions answered. Bailee Mckay, APRN - CNP    **This report was transcribed using voice recognition software.  Every effort was made to ensure accuracy; however, inadvertent computerized transcription errors may be present.

## 2022-07-16 ENCOUNTER — HOSPITAL ENCOUNTER (EMERGENCY)
Age: 72
Discharge: HOME | End: 2022-07-16
Payer: MEDICARE

## 2022-07-16 ENCOUNTER — APPOINTMENT (OUTPATIENT)
Dept: ULTRASOUND IMAGING | Age: 72
End: 2022-07-16
Payer: MEDICARE

## 2022-07-16 VITALS
HEIGHT: 72 IN | TEMPERATURE: 97.5 F | DIASTOLIC BLOOD PRESSURE: 70 MMHG | OXYGEN SATURATION: 97 % | BODY MASS INDEX: 28.44 KG/M2 | RESPIRATION RATE: 18 BRPM | SYSTOLIC BLOOD PRESSURE: 118 MMHG | HEART RATE: 78 BPM | WEIGHT: 210 LBS

## 2022-07-16 DIAGNOSIS — T14.8XXA HEMATOMA: Primary | ICD-10-CM

## 2022-07-16 PROCEDURE — 99284 EMERGENCY DEPT VISIT MOD MDM: CPT

## 2022-07-16 PROCEDURE — 93971 EXTREMITY STUDY: CPT

## 2022-07-16 RX ORDER — GABAPENTIN 600 MG/1
600 TABLET ORAL 3 TIMES DAILY
COMMUNITY

## 2022-07-16 RX ORDER — ACETAMINOPHEN 500 MG
1000 TABLET ORAL 3 TIMES DAILY PRN
Qty: 42 TABLET | Refills: 0 | Status: SHIPPED | OUTPATIENT
Start: 2022-07-16 | End: 2022-07-23

## 2022-07-16 ASSESSMENT — LIFESTYLE VARIABLES
HOW OFTEN DO YOU HAVE A DRINK CONTAINING ALCOHOL: 4 OR MORE TIMES A WEEK
HOW MANY STANDARD DRINKS CONTAINING ALCOHOL DO YOU HAVE ON A TYPICAL DAY: 3 OR 4

## 2022-07-16 ASSESSMENT — PAIN DESCRIPTION - LOCATION
LOCATION: ARM
LOCATION: ARM

## 2022-07-16 ASSESSMENT — PAIN DESCRIPTION - ORIENTATION
ORIENTATION: RIGHT
ORIENTATION: RIGHT

## 2022-07-16 ASSESSMENT — PAIN SCALES - GENERAL
PAINLEVEL_OUTOF10: 7
PAINLEVEL_OUTOF10: 4
PAINLEVEL_OUTOF10: 9

## 2022-07-16 ASSESSMENT — PAIN - FUNCTIONAL ASSESSMENT
PAIN_FUNCTIONAL_ASSESSMENT: 0-10

## 2022-07-16 NOTE — Clinical Note
Rory Dorado was seen and treated in our emergency department on 7/16/2022. He may return to work on 07/17/2022. If you have any questions or concerns, please don't hesitate to call.       Oscar Linder PA-C

## 2022-07-16 NOTE — ED PROVIDER NOTES
[facility]     Department of Emergency Medicine   ED  Encounter Note  Admit Date/RoomTime: 2022  7:35 PM  ED Room:     NAME: Aris Lee  : 1950  MRN: [id number]     Chief Complaint:  Arm Injury (Bruising and pain of right arm. Unable to lift arm. Injury occurred 2 days ago)    History of Present Illness       Aris  is a 74 y.o. old male who presents to the emergency department by private vehicle, for traumatic right arm pain which occured 2 day(s) prior to arrival.  The complaint is due to using a marcia and the marcia started to tip over so he jerked the marcia back to it's resting position. Patient has no prior history of pain/injury with regards to today's visit. Since onset the symptoms have been acutely worsening PTA when he was out to eat and tried to grab something out of his pocket. His pain is aggraveated by any movement or pressure on or palpation of painful area and relieved by nothing. He denies any head injury, headache, loss of consciousness, confusion, dizziness, neck pain, chest pain, abdominal pain, back pain, numbness, weakness, blurred vision, nausea, vomiting, fever, chills, wounds, or rash. ROS   Pertinent positives and negatives are stated within HPI, all other systems reviewed and are negative. Past Medical History:  has a past medical history of Hyperlipidemia and Hypertension. Surgical History:  has a past surgical history that includes Cardiac surgery. Social History:  reports that he has never smoked. He has never used smokeless tobacco. He reports current alcohol use. He reports that he does not use drugs. Family History: family history is not on file. Allergies: Patient has no known allergies.     Physical Exam   Oxygen Saturation Interpretation: Normal.        ED Triage Vitals   BP Temp Temp Source Heart Rate Resp SpO2 Height Weight   22 1930 22 primary care provider. Understands that he may require repeat sonography. Carpal tunnel syndrome was considered, however the patient has no numbness or weakness, normal radial pulses and capillary refill distally, and minimal pain. Strict return precautions were discussed and he understands to return immediately for new or worsening symptoms. .    Plan of Care/Counseling:  Marguerite Frankel, PA-C reviewed today's visit with the patient in addition to providing specific details for the plan of care and counseling regarding the diagnosis and prognosis. Questions are answered at this time and are agreeable with the plan. Assessment      1. Hematoma      Plan   Discharged home. Patient condition is good    New Medications     New Prescriptions    ACETAMINOPHEN (TYLENOL) 500 MG TABLET    Take 2 tablets by mouth 3 times daily as needed for Pain or Fever     Electronically signed by Marguerite Frankel, PA-C   DD: 7/16/22  **This report was transcribed using voice recognition software. Every effort was made to ensure accuracy; however, inadvertent computerized transcription errors may be present.   END OF ED PROVIDER NOTE       Marguerite Frankel, PA-C  07/16/22 [id number]

## 2022-07-19 ENCOUNTER — OFFICE VISIT (OUTPATIENT)
Dept: FAMILY MEDICINE CLINIC | Age: 72
End: 2022-07-19
Payer: MEDICARE

## 2022-07-19 VITALS
SYSTOLIC BLOOD PRESSURE: 133 MMHG | TEMPERATURE: 97.2 F | HEART RATE: 63 BPM | RESPIRATION RATE: 17 BRPM | HEIGHT: 72 IN | WEIGHT: 210 LBS | BODY MASS INDEX: 28.44 KG/M2 | OXYGEN SATURATION: 98 % | DIASTOLIC BLOOD PRESSURE: 76 MMHG

## 2022-07-19 DIAGNOSIS — L03.113 CELLULITIS OF RIGHT UPPER EXTREMITY: ICD-10-CM

## 2022-07-19 DIAGNOSIS — S29.011S: ICD-10-CM

## 2022-07-19 DIAGNOSIS — T14.8XXA HEMATOMA: Primary | ICD-10-CM

## 2022-07-19 DIAGNOSIS — R60.1 GENERALIZED EDEMA: ICD-10-CM

## 2022-07-19 PROCEDURE — G8417 CALC BMI ABV UP PARAM F/U: HCPCS | Performed by: NURSE PRACTITIONER

## 2022-07-19 PROCEDURE — 99214 OFFICE O/P EST MOD 30 MIN: CPT | Performed by: NURSE PRACTITIONER

## 2022-07-19 PROCEDURE — G8427 DOCREV CUR MEDS BY ELIG CLIN: HCPCS | Performed by: NURSE PRACTITIONER

## 2022-07-19 PROCEDURE — 3017F COLORECTAL CA SCREEN DOC REV: CPT | Performed by: NURSE PRACTITIONER

## 2022-07-19 PROCEDURE — 1036F TOBACCO NON-USER: CPT | Performed by: NURSE PRACTITIONER

## 2022-07-19 PROCEDURE — 1123F ACP DISCUSS/DSCN MKR DOCD: CPT | Performed by: NURSE PRACTITIONER

## 2022-07-19 RX ORDER — FUROSEMIDE 20 MG/1
20 TABLET ORAL DAILY
Qty: 3 TABLET | Refills: 0 | Status: SHIPPED | OUTPATIENT
Start: 2022-07-19 | End: 2022-10-21

## 2022-07-19 RX ORDER — DOXYCYCLINE HYCLATE 100 MG
100 TABLET ORAL 2 TIMES DAILY
Qty: 14 TABLET | Refills: 0 | Status: SHIPPED | OUTPATIENT
Start: 2022-07-19 | End: 2022-07-26

## 2022-07-19 ASSESSMENT — PATIENT HEALTH QUESTIONNAIRE - PHQ9
SUM OF ALL RESPONSES TO PHQ QUESTIONS 1-9: 0
1. LITTLE INTEREST OR PLEASURE IN DOING THINGS: 0
DEPRESSION UNABLE TO ASSESS: FUNCTIONAL CAPACITY MOTIVATION LIMITS ACCURACY
SUM OF ALL RESPONSES TO PHQ9 QUESTIONS 1 & 2: 0
SUM OF ALL RESPONSES TO PHQ QUESTIONS 1-9: 0
2. FEELING DOWN, DEPRESSED OR HOPELESS: 0
SUM OF ALL RESPONSES TO PHQ QUESTIONS 1-9: 0
SUM OF ALL RESPONSES TO PHQ QUESTIONS 1-9: 0

## 2022-07-19 NOTE — PROGRESS NOTES
Chief Complaint       Arm Injury (Tore bicep in right arm on friday)    History of Present Illness   Source of history provided by:  patient. Marcos Weems is a 74 y.o. old male presenting to the walk in clinic for evaluation of right arm/shoulder pain x 5 days. Reports known injury which he sought emergency care at [facility] hospital . The pain is aggravated by movement and alleviated with rest. Pt states the pain in the shoulder and radiates down the arm or into the neck with excessive swelling. Reports provided no medication or pain reliever. Denies any weakness, paresthesias, neck pain or injury, HA, hand weakness, or associated CP or SOB. Has been taking no at home with minimal symptomatic relief. Imaging concluded hematoma & no DVT. PT does not follow with ortho. Pt takes 81 mg ASA daily. PCP has scheduled US for patient in [facility] though he returns to us today. ROS    Unless otherwise stated in this report or unable to obtain because of the patient's clinical or mental status as evidenced by the medical record, this patients's positive and negative responses for Review of Systems, constitutional, psych, eyes, ENT, cardiovascular, respiratory, gastrointestinal, neurological, genitourinary, musculoskeletal, integument systems and systems related to the presenting problem are either stated in the preceding or were not pertinent or were negative for the symptoms and/or complaints related to the medical problem. Past Medical History:  has a past medical history of Hyperlipidemia and Hypertension. Past Surgical History:  has a past surgical history that includes Cardiac surgery. Social History:  reports that he has never smoked. He has never used smokeless tobacco. He reports current alcohol use. He reports that he does not use drugs. Family History: family history is not on file. Allergies: Patient has no known allergies.     Physical Exam         VS:  /76   Pulse 63   Temp 97.2 °F (36.2 °C) (Temporal)   Resp 17   Ht 6' (1.829 m)   Wt 210 lb (95.3 kg)   SpO2 98%   BMI 28.48 kg/m²    Oxygen Saturation Interpretation: Normal.  Constitutional:  Alert, development consistent with age. Neck:  Normal ROM. Supple. Non-tender. Chest: Heart RRR without pathological murmur or gallop. Lungs CTAB without W/R/R. Shoulder:              Tenderness: TTP over right shoulder without any bony point tenderness. Swelling: No obvious swelling noted. Deformity: No obvious deformity. ROM: Limited ROM due to pain. Increased pain with palpation. UE/ strength 5/5 bilaterally. (-) drop arm test.            Skin:  No abrasions, bruising, or erythema noted. Neurovascular:              Sensory deficit: Sensation intact proximally and distally to the injury site. Pulse deficit: Distal pulses 2+ and bounding. Capillary refill: Less then 2 sec throughout. Elbow:              Tenderness:  Pain with ROM or palpation. Swelling: Moderate warmth & edema noted from elbow to hand. Serious drainage noted. ROM: FROM without deficits. No pain with supination or pronation of the hand. Skin:  No rash, abrasions. Bruising noted. Lymphatics: No lymphangitis or adenopathy noted. Neurological: Alert and oriented. Motor functions intact. Lab / Imaging Results   (All laboratory and radiology results have been personally reviewed by myself)  Labs:  No results found for this visit on 07/19/22. Imaging: All Radiology results interpreted by Radiologist unless otherwise noted. Narrative   EXAMINATION:   DUPLEX ULTRASOUND OF THE RIGHT UPPER EXTREMITY FOR DVT, 7/16/2022 8:12 pm       TECHNIQUE:   Duplex ultrasound using B-mode/gray scaled imaging and Doppler spectral   analysis and color flow was obtained of the deep venous structures of the   upper right extremity. COMPARISON:   None.        HISTORY: flow study and spectral analysis. There is a 6.8 x 4.7 x 4.2 cm complex mass in the axilla. Doppler   demonstrates no evidence of internal flow. Impression   No evidence of DVT. 6.8 x 4.2 cm complex mass in the axilla. This likely represent improvement   previously noted 15.4 x 5.9 x 6.5 cm complex mass, thought to have   represented hematoma. Continued follow-up study recommended to check for   resolution. Assessment / Plan     Impression(s):  Karishma Tello was seen today for arm injury. Diagnoses and all orders for this visit:    Hematoma  -     US DUP UPPER EXTREMITY RIGHT VENOUS; Future    Generalized edema   -     US DUP UPPER EXTREMITY RIGHT VENOUS; Future  -     furosemide (LASIX) 20 MG tablet; Take 1 tablet by mouth in the morning for 3 days. - Tylenol PRN for pain    Cellulitis of right upper extremity  -     doxycycline hyclate (VIBRA-TABS) 100 MG tablet; Take 1 tablet by mouth in the morning and 1 tablet before bedtime. Do all this for 7 days. Disposition:  Disposition: Discharge to imaging. Scripts written, side effects discussed. Advise rest, ice, and/or moist heat for additional symptomatic relief. PCP in 1-2 weeks for recheck if symptoms persist. ED sooner if symptoms worsen or change. ED immediately with severe or worsening pain, paresthesias, weakness, CP, or SOB. Pt states understanding and is in agreement with this care plan. All questions answered. Olesya Jansen, [facility] - CNP    **This report was transcribed using voice recognition software. Every effort was made to ensure accuracy; however, inadvertent computerized transcription errors may be present.

## 2022-10-03 ENCOUNTER — TELEPHONE (OUTPATIENT)
Dept: INTERNAL MEDICINE CLINIC | Age: 72
End: 2022-10-03

## 2022-10-03 DIAGNOSIS — E78.49 OTHER HYPERLIPIDEMIA: Primary | ICD-10-CM

## 2022-10-03 DIAGNOSIS — I10 PRIMARY HYPERTENSION: ICD-10-CM

## 2022-10-03 DIAGNOSIS — I25.10 CORONARY ARTERY DISEASE INVOLVING NATIVE HEART WITHOUT ANGINA PECTORIS, UNSPECIFIED VESSEL OR LESION TYPE: ICD-10-CM

## 2022-10-03 DIAGNOSIS — R73.01 IMPAIRED FASTING GLUCOSE: ICD-10-CM

## 2022-10-03 DIAGNOSIS — Z13.9 SCREENING DUE: ICD-10-CM

## 2022-10-21 ENCOUNTER — HOSPITAL ENCOUNTER (EMERGENCY)
Age: 72
Discharge: HOME | End: 2022-10-21
Attending: EMERGENCY MEDICINE
Payer: MEDICARE

## 2022-10-21 ENCOUNTER — APPOINTMENT (OUTPATIENT)
Dept: GENERAL RADIOLOGY | Age: 72
End: 2022-10-21
Payer: MEDICARE

## 2022-10-21 VITALS
SYSTOLIC BLOOD PRESSURE: 193 MMHG | OXYGEN SATURATION: 98 % | WEIGHT: 206 LBS | TEMPERATURE: 97.5 F | BODY MASS INDEX: 27.9 KG/M2 | HEART RATE: 61 BPM | DIASTOLIC BLOOD PRESSURE: 96 MMHG | HEIGHT: 72 IN | RESPIRATION RATE: 18 BRPM

## 2022-10-21 DIAGNOSIS — M25.511 ACUTE PAIN OF RIGHT SHOULDER: Primary | ICD-10-CM

## 2022-10-21 PROCEDURE — 6360000002 HC RX W HCPCS: Performed by: PHYSICIAN ASSISTANT

## 2022-10-21 PROCEDURE — 96372 THER/PROPH/DIAG INJ SC/IM: CPT

## 2022-10-21 PROCEDURE — 6370000000 HC RX 637 (ALT 250 FOR IP)

## 2022-10-21 PROCEDURE — 73030 X-RAY EXAM OF SHOULDER: CPT

## 2022-10-21 PROCEDURE — 99284 EMERGENCY DEPT VISIT MOD MDM: CPT

## 2022-10-21 RX ORDER — CYCLOBENZAPRINE HCL 10 MG
TABLET ORAL
Status: COMPLETED
Start: 2022-10-21 | End: 2022-10-21

## 2022-10-21 RX ORDER — KETOROLAC TROMETHAMINE 30 MG/ML
30 INJECTION, SOLUTION INTRAMUSCULAR; INTRAVENOUS ONCE
Status: COMPLETED | OUTPATIENT
Start: 2022-10-21 | End: 2022-10-21

## 2022-10-21 RX ORDER — CYCLOBENZAPRINE HCL 10 MG
10 TABLET ORAL ONCE
Status: COMPLETED | OUTPATIENT
Start: 2022-10-21 | End: 2022-10-21

## 2022-10-21 RX ORDER — METHOCARBAMOL 500 MG/1
500 TABLET, FILM COATED ORAL 3 TIMES DAILY PRN
Qty: 20 TABLET | Refills: 0 | Status: SHIPPED | OUTPATIENT
Start: 2022-10-21 | End: 2022-10-31

## 2022-10-21 RX ORDER — HYDROCODONE BITARTRATE AND ACETAMINOPHEN 5; 325 MG/1; MG/1
1 TABLET ORAL EVERY 6 HOURS PRN
Qty: 12 TABLET | Refills: 0 | Status: SHIPPED | OUTPATIENT
Start: 2022-10-21 | End: 2022-10-24

## 2022-10-21 RX ADMIN — KETOROLAC TROMETHAMINE 30 MG: 30 INJECTION, SOLUTION INTRAMUSCULAR at 19:03

## 2022-10-21 RX ADMIN — Medication 10 MG: at 19:36

## 2022-10-21 RX ADMIN — CYCLOBENZAPRINE 10 MG: 10 TABLET, FILM COATED ORAL at 19:36

## 2022-10-21 ASSESSMENT — PAIN DESCRIPTION - ORIENTATION
ORIENTATION: RIGHT
ORIENTATION: RIGHT

## 2022-10-21 ASSESSMENT — PAIN DESCRIPTION - DESCRIPTORS
DESCRIPTORS: ACHING
DESCRIPTORS: ACHING

## 2022-10-21 ASSESSMENT — PAIN - FUNCTIONAL ASSESSMENT
PAIN_FUNCTIONAL_ASSESSMENT: 0-10
PAIN_FUNCTIONAL_ASSESSMENT: PREVENTS OR INTERFERES SOME ACTIVE ACTIVITIES AND ADLS

## 2022-10-21 ASSESSMENT — PAIN SCALES - GENERAL: PAINLEVEL_OUTOF10: 10

## 2022-10-21 ASSESSMENT — PAIN DESCRIPTION - FREQUENCY: FREQUENCY: CONTINUOUS

## 2022-10-21 ASSESSMENT — PAIN DESCRIPTION - LOCATION
LOCATION: SHOULDER
LOCATION: SHOULDER

## 2022-10-21 ASSESSMENT — LIFESTYLE VARIABLES
HOW OFTEN DO YOU HAVE A DRINK CONTAINING ALCOHOL: NEVER
HOW MANY STANDARD DRINKS CONTAINING ALCOHOL DO YOU HAVE ON A TYPICAL DAY: PATIENT DOES NOT DRINK

## 2022-10-21 ASSESSMENT — PAIN DESCRIPTION - ONSET: ONSET: ON-GOING

## 2022-10-21 NOTE — ED PROVIDER NOTES
Independent MLP        HPI:  10/21/22, Time: 6:35 PM EDT         Ethan Bennett is a 74 y.o. male presenting to the ED for right shoulder pain, beginning 1 day ago. The complaint has been persistent, moderate in severity, and worsened by movement of right shoulder. Patient reports that he had an injection in his right shoulder by Dr. Ignacio Barber in [employer] [place] 2 days ago. Reports that the day of the injection he noticed symptom improvement however yesterday and today he noticed the pain was worsening in his right shoulder therefore prompting evaluation today. Reports that the pain is in the anterior aspect of the right shoulder and does not radiate. He has had 2 injections in the shoulder previously, both of which have been successful in improving his pain. Does have a history of a torn biceps muscle on this shoulder for which she follows with orthopedic. Denies any chest pain or shortness of breath. Has a difficult time laying flat to sleep and has therefore been sleeping in the recliner causing his feet to swell slightly. Afebrile without recent travel or sick contacts. Patient denies all other symptoms at this time. Review of Systems:   A complete review of systems was performed and pertinent positives and negatives are stated within HPI, all other systems reviewed and are negative.          --------------------------------------------- PAST HISTORY ---------------------------------------------  Past Medical History:  has a past medical history of Hyperlipidemia and Hypertension. Past Surgical History:  has a past surgical history that includes Cardiac surgery. Social History:  reports that he has never smoked. He has never used smokeless tobacco. He reports current alcohol use. He reports that he does not use drugs. Family History: family history is not on file. The patients home medications have been reviewed.     Allergies: Patient has no known allergies. -------------------------------------------------- RESULTS -------------------------------------------------  All laboratory and radiology results have been personally reviewed by myself   LABS:  No results found for this visit on 10/21/22. RADIOLOGY:  Interpreted by Radiologist.  XR SHOULDER RIGHT (MIN 2 VIEWS)   Final Result   No acute bony abnormalities. Suspect joint effusion.             ------------------------- NURSING NOTES AND VITALS REVIEWED ---------------------------   The nursing notes within the ED encounter and vital signs as below have been reviewed. BP (!) 197/112   Pulse 64   Temp 97.5 °F (36.4 °C)   Resp 16   Ht 6' (1.829 m)   Wt 206 lb (93.4 kg)   SpO2 98%   BMI 27.94 kg/m²   Oxygen Saturation Interpretation: Normal      ---------------------------------------------------PHYSICAL EXAM--------------------------------------      Constitutional/General: Alert and oriented x3, well appearing, non toxic in NAD  Head: Normocephalic and atraumatic  Eyes: PERRL, EOMI  Mouth: Oropharynx clear, handling secretions, no trismus  Neck: Supple, full ROM,   Pulmonary: Lungs clear to auscultation bilaterally, no wheezes, rales, or rhonchi. Not in respiratory distress  Cardiovascular:  Regular rate and rhythm, no murmurs, gallops, or rubs. 2+ distal pulses  Abdomen: Soft, non tender, non distended,   Extremities: Moves all extremities x 4. Warm and well perfused. Tenderness to palpation noted to the anterior aspect of the right shoulder with a soft lump to the anterior aspect just inferior to the [facility] joint. Range of motion of the shoulder limited secondary to pain however does still have full passive range of motion. 2+ radial pulse on the right. Compartments soft in the right upper extremity. 1+ pedal edema bilaterally.     Skin: warm and dry without rash  Neurologic: GCS 15,  Psych: Normal Affect      ------------------------------ ED COURSE/MEDICAL DECISION MAKING----------------------  Medications   ketorolac (TORADOL) injection 30 mg (30 mg IntraMUSCular Given 10/21/22 1903)   cyclobenzaprine (FLEXERIL) tablet 10 mg (10 mg Oral Given 10/21/22 1936)         ED COURSE:       Medical Decision Making:    Patient is a 64-year-old male presenting to the emergency department the right shoulder pain after an injection several days ago. He is nontoxic-appearing, afebrile, no acute distress. No overlying skin changes. Does have full passive range of motion of the right shoulder. Small joint effusion noted on x-ray otherwise no acute pathology. Very low suspicion for a joint infection, I do believe symptomatology is more likely to his joint effusion versus hematoma development. Compartments in the right upper extremity soft and nontender. At this time we will plan on outpatient management with close follow-up with PCP and orthopedic for recheck. Did encourage him to continue moving his right upper extremity as to avoid a DVT. Advised return the emergency department any new or worsening symptoms. Patient and daughter voiced understanding and are agreeable to the above treatment plan. Counseling: The emergency provider has spoken with the family member patient and daughter and discussed todays results, in addition to providing specific details for the plan of care and counseling regarding the diagnosis and prognosis. Questions are answered at this time and they are agreeable with the plan.      --------------------------------- IMPRESSION AND DISPOSITION ---------------------------------    IMPRESSION  1. Acute pain of right shoulder        DISPOSITION  Disposition: Discharge to home  Patient condition is stable      NOTE: This report was transcribed using voice recognition software.  Every effort was made to ensure accuracy; however, inadvertent computerized transcription errors may be present        Darrin Esquivel  10/21/22 1939

## 2022-11-16 ENCOUNTER — HOSPITAL ENCOUNTER (EMERGENCY)
Age: 72
Discharge: HOME | End: 2022-11-16
Attending: EMERGENCY MEDICINE
Payer: MEDICARE

## 2022-11-16 ENCOUNTER — APPOINTMENT (OUTPATIENT)
Dept: GENERAL RADIOLOGY | Age: 72
End: 2022-11-16
Payer: MEDICARE

## 2022-11-16 VITALS
HEART RATE: 62 BPM | DIASTOLIC BLOOD PRESSURE: 88 MMHG | TEMPERATURE: 98.2 F | BODY MASS INDEX: 28.44 KG/M2 | HEIGHT: 72 IN | SYSTOLIC BLOOD PRESSURE: 158 MMHG | WEIGHT: 210 LBS | RESPIRATION RATE: 16 BRPM | OXYGEN SATURATION: 99 %

## 2022-11-16 DIAGNOSIS — M25.511 ACUTE PAIN OF RIGHT SHOULDER: Primary | ICD-10-CM

## 2022-11-16 PROCEDURE — 99284 EMERGENCY DEPT VISIT MOD MDM: CPT

## 2022-11-16 PROCEDURE — 6360000002 HC RX W HCPCS: Performed by: EMERGENCY MEDICINE

## 2022-11-16 PROCEDURE — 96372 THER/PROPH/DIAG INJ SC/IM: CPT

## 2022-11-16 PROCEDURE — 73030 X-RAY EXAM OF SHOULDER: CPT

## 2022-11-16 PROCEDURE — 6370000000 HC RX 637 (ALT 250 FOR IP): Performed by: EMERGENCY MEDICINE

## 2022-11-16 RX ORDER — NAPROXEN 500 MG/1
500 TABLET ORAL 2 TIMES DAILY PRN
Qty: 60 TABLET | Refills: 0 | Status: SHIPPED | OUTPATIENT
Start: 2022-11-16

## 2022-11-16 RX ORDER — CYCLOBENZAPRINE HCL 10 MG
5 TABLET ORAL 3 TIMES DAILY PRN
Qty: 15 TABLET | Refills: 0 | Status: SHIPPED | OUTPATIENT
Start: 2022-11-16 | End: 2022-11-26

## 2022-11-16 RX ORDER — CYCLOBENZAPRINE HCL 10 MG
10 TABLET ORAL ONCE
Status: COMPLETED | OUTPATIENT
Start: 2022-11-16 | End: 2022-11-16

## 2022-11-16 RX ORDER — KETOROLAC TROMETHAMINE 30 MG/ML
30 INJECTION, SOLUTION INTRAMUSCULAR; INTRAVENOUS ONCE
Status: COMPLETED | OUTPATIENT
Start: 2022-11-16 | End: 2022-11-16

## 2022-11-16 RX ADMIN — CYCLOBENZAPRINE 10 MG: 10 TABLET, FILM COATED ORAL at 20:10

## 2022-11-16 RX ADMIN — KETOROLAC TROMETHAMINE 30 MG: 30 INJECTION, SOLUTION INTRAMUSCULAR at 20:10

## 2022-11-16 ASSESSMENT — PAIN DESCRIPTION - PAIN TYPE: TYPE: ACUTE PAIN

## 2022-11-16 ASSESSMENT — LIFESTYLE VARIABLES
HOW MANY STANDARD DRINKS CONTAINING ALCOHOL DO YOU HAVE ON A TYPICAL DAY: PATIENT DOES NOT DRINK
HOW OFTEN DO YOU HAVE A DRINK CONTAINING ALCOHOL: NEVER
HOW OFTEN DO YOU HAVE A DRINK CONTAINING ALCOHOL: NEVER

## 2022-11-16 ASSESSMENT — PAIN DESCRIPTION - ONSET: ONSET: ON-GOING

## 2022-11-16 ASSESSMENT — PAIN DESCRIPTION - FREQUENCY: FREQUENCY: CONTINUOUS

## 2022-11-16 ASSESSMENT — PAIN SCALES - GENERAL: PAINLEVEL_OUTOF10: 10

## 2022-11-16 ASSESSMENT — PAIN - FUNCTIONAL ASSESSMENT: PAIN_FUNCTIONAL_ASSESSMENT: 0-10

## 2022-11-16 ASSESSMENT — PAIN DESCRIPTION - DESCRIPTORS: DESCRIPTORS: ACHING

## 2022-11-16 ASSESSMENT — PAIN DESCRIPTION - LOCATION: LOCATION: SHOULDER

## 2022-11-16 ASSESSMENT — PAIN DESCRIPTION - ORIENTATION: ORIENTATION: RIGHT

## 2022-11-17 NOTE — ED PROVIDER NOTES
HPI:  11/16/22,   Time: 7:16 PM GETACHEW Biggs is a 74 y.o. male presenting to the ED for rt shoulder pain, acute on chronic, beginning hrs ago. The complaint has been persistent, moderate in severity, and worsened by movement of shoulder. Hx chronic shoulder pain, here for same, was doing exercises walking shoulder up wall. Pain after, hurts to move. No numbness/tingling/focal weakness. Bib private vehicle. No deformity/falls    Review of Systems:   Pertinent positives and negatives are stated within HPI, all other systems reviewed and are negative.          --------------------------------------------- PAST HISTORY ---------------------------------------------  Past Medical History:  has a past medical history of Hyperlipidemia and Hypertension. Past Surgical History:  has a past surgical history that includes Cardiac surgery. Social History:  reports that he has never smoked. He has never used smokeless tobacco. He reports that he does not currently use alcohol. He reports that he does not use drugs. Family History: family history is not on file. The patients home medications have been reviewed. Allergies: Patient has no known allergies. ---------------------------------------------------PHYSICAL EXAM--------------------------------------    Constitutional/General: Alert and oriented x3, well appearing, non toxic in NAD  Head: Normocephalic and atraumatic  Eyes: PERRL, EOMI, conjunctive normal, sclera non icteric  Mouth: Oropharynx clear, handling secretions, no trismus, no asymmetry of the posterior oropharynx or uvular edema  Neck: Supple, full ROM,  Respiratory: . Not in respiratory distress  Cardiovascular:  . 2+ distal pulses    . Musculoskeletal: limited rom right shoulder due to pain, diffuse right shoulder pain, no deformity, nvi distal.  Warm and well perfused, no clubbing, cyanosis, or edema. Capillary refill <3 seconds  Integument: skin warm and dry. No rashes. Lymphatic: no lymphadenopathy noted  Neurologic: GCS 15, no focal deficits,   Psychiatric: Normal Affect    -------------------------------------------------- RESULTS -------------------------------------------------  I have personally reviewed all laboratory and imaging results for this patient. Results are listed below. LABS:  No results found for this visit on 11/16/22. RADIOLOGY:  Interpreted by Radiologist.  XR SHOULDER RIGHT (MIN 2 VIEWS)   Final Result   1. Decreased osseous mineralization. No acute fracture about the right   shoulder. Inferior subluxation of the humeral head. 2.  Mild to moderate right shoulder degenerative changes. 3.  Atherosclerotic disease and postsurgical changes over the mediastinum. RECOMMENDATION:   In the setting of recent trauma, if there is persistent symptoms and physical   exam warrants a repeat radiograph in 10-14 days could be considered as occult   fractures may not be evident on initial imaging evaluation. EKG:  This EKG is signed and interpreted by the EP. Time:   Rate:   Rhythm:   Interpretation:   Comparison:       ------------------------- NURSING NOTES AND VITALS REVIEWED ---------------------------   The nursing notes within the ED encounter and vital signs as below have been reviewed by myself. BP (!) 158/88   Pulse 62   Temp 98.2 °F (36.8 °C) (Oral)   Resp 16   Ht 6' (1.829 m)   Wt 210 lb (95.3 kg)   SpO2 99%   BMI 28.48 kg/m²   Oxygen Saturation Interpretation: Normal    The patients available past medical records and past encounters were reviewed.         ------------------------------ ED COURSE/MEDICAL DECISION MAKING----------------------  Medications   ketorolac (TORADOL) injection 30 mg (30 mg IntraMUSCular Given 11/16/22 2010)   cyclobenzaprine (FLEXERIL) tablet 10 mg (10 mg Oral Given 11/16/22 2010)         ED COURSE:       Medical Decision Making:    Results noted, chronic shoulder issues, nml rom, nvi distal, pain controlled, dc with outpt fu. Sublux noted, likely due to muscular atrophy, dc home      This patient's ED course included: a personal history and physicial examination    This patient has remained hemodynamically stable during their ED course. Counseling: The emergency provider has spoken with the patient and family member patient and brother and discussed todays results, in addition to providing specific details for the plan of care and counseling regarding the diagnosis and prognosis. Questions are answered at this time and they are agreeable with the plan.       --------------------------------- IMPRESSION AND DISPOSITION ---------------------------------    IMPRESSION  1. Acute pain of right shoulder        DISPOSITION  Disposition: Discharge to home  Patient condition is stable    NOTE: This report was transcribed using voice recognition software.  Every effort was made to ensure accuracy; however, inadvertent computerized transcription errors may be present        Van Teixeira MD  11/16/22 3511

## 2022-11-28 ENCOUNTER — HOSPITAL ENCOUNTER (EMERGENCY)
Age: 72
Discharge: HOME | End: 2022-11-28
Payer: MEDICARE

## 2022-11-28 VITALS
OXYGEN SATURATION: 99 % | DIASTOLIC BLOOD PRESSURE: 102 MMHG | HEART RATE: 73 BPM | BODY MASS INDEX: 29.16 KG/M2 | WEIGHT: 215 LBS | TEMPERATURE: 97.4 F | SYSTOLIC BLOOD PRESSURE: 178 MMHG | RESPIRATION RATE: 16 BRPM

## 2022-11-28 DIAGNOSIS — M25.511 CHRONIC RIGHT SHOULDER PAIN: Primary | ICD-10-CM

## 2022-11-28 DIAGNOSIS — G89.29 CHRONIC RIGHT SHOULDER PAIN: Primary | ICD-10-CM

## 2022-11-28 PROCEDURE — 96372 THER/PROPH/DIAG INJ SC/IM: CPT

## 2022-11-28 PROCEDURE — 99284 EMERGENCY DEPT VISIT MOD MDM: CPT

## 2022-11-28 PROCEDURE — 6360000002 HC RX W HCPCS: Performed by: PHYSICIAN ASSISTANT

## 2022-11-28 RX ORDER — PREDNISONE 20 MG/1
20 TABLET ORAL 2 TIMES DAILY
Qty: 10 TABLET | Refills: 0 | Status: SHIPPED | OUTPATIENT
Start: 2022-11-28 | End: 2022-12-03

## 2022-11-28 RX ORDER — DEXAMETHASONE SODIUM PHOSPHATE 10 MG/ML
10 INJECTION INTRAMUSCULAR; INTRAVENOUS ONCE
Status: COMPLETED | OUTPATIENT
Start: 2022-11-28 | End: 2022-11-28

## 2022-11-28 RX ORDER — NAPROXEN 500 MG/1
500 TABLET ORAL 2 TIMES DAILY
Qty: 14 TABLET | Refills: 0 | Status: SHIPPED | OUTPATIENT
Start: 2022-11-28 | End: 2022-12-05

## 2022-11-28 RX ORDER — KETOROLAC TROMETHAMINE 30 MG/ML
30 INJECTION, SOLUTION INTRAMUSCULAR; INTRAVENOUS ONCE
Status: COMPLETED | OUTPATIENT
Start: 2022-11-28 | End: 2022-11-28

## 2022-11-28 RX ADMIN — KETOROLAC TROMETHAMINE 30 MG: 30 INJECTION, SOLUTION INTRAMUSCULAR at 17:08

## 2022-11-28 RX ADMIN — DEXAMETHASONE SODIUM PHOSPHATE 10 MG: 10 INJECTION INTRAMUSCULAR; INTRAVENOUS at 17:09

## 2022-11-28 ASSESSMENT — PAIN SCALES - GENERAL
PAINLEVEL_OUTOF10: 10
PAINLEVEL_OUTOF10: 10

## 2022-11-28 ASSESSMENT — PAIN DESCRIPTION - ORIENTATION: ORIENTATION: RIGHT

## 2022-11-28 ASSESSMENT — PAIN DESCRIPTION - LOCATION: LOCATION: ARM

## 2022-11-28 ASSESSMENT — PAIN - FUNCTIONAL ASSESSMENT: PAIN_FUNCTIONAL_ASSESSMENT: 0-10

## 2022-11-28 NOTE — ED PROVIDER NOTES
[facility]  Department of Emergency Medicine   ED  Encounter Note  Admit Date/RoomTime: 2022  3:44 PM  ED Room: [facility]    NAME: Maximino Young  : 1950  MRN: [id number]     Chief Complaint:  Arm Pain (2-3 months with right pain, no injury known.)    History of Present Illness       Maximino Young is a 74 y.o. old male who presents to the emergency department by private vehicle, for non-traumatic Right upper arm and shoulder pain which occured 3 month(s) prior to arrival.  The complaint is due to no known cause. Patient  has a prior history of pain to mentioned area related to today's visit. He is right handed. The patients tetanus status is up to date. Since onset the symptoms have been stable and persistent. His pain is aggraveated by any movement and relieved by nothing. He denies any head injury, headache, loss of consciousness, confusion, dizziness, neck pain, chest pain, abdominal pain, back pain, extremity injury, numbness, weakness, blurred vision, nausea, vomiting, fever, chills, wounds, or rash. Patient is currently in pain management and recently had a cortisone injection about a week and a half ago. Patient states he was seen here couple times over the last few months and had a \"shot that made him feel better. \"  Patient has not any any narcotics. Patient states he used a heating pad for the first time last night because he had some discomfort and since waking up today it is much worse. Patient denies any other radiation of any pain. Patient denies any falls or trauma. Patient was recently x-rayed within the last 2 weeks and denies any new injury since then. Patient has no paresthesias. Patient has no weakness in his extremity just pain. Patient denies any headaches, blurry vision, chest pain, shortness of breath, fever, chills, nausea, vomiting, severe abdominal pain, change in bowel or bladder movements.     ROS   Pertinent positives and negatives are stated within HPI, all other systems reviewed and are negative. Past Medical History:  has a past medical history of Hyperlipidemia and Hypertension. Surgical History:  has a past surgical history that includes Cardiac surgery. Social History:  reports that he has never smoked. He has never used smokeless tobacco. He reports that he does not currently use alcohol. He reports that he does not use drugs. Family History: family history is not on file. Allergies: Patient has no known allergies. Physical Exam   Oxygen Saturation Interpretation: Normal.        ED Triage Vitals   BP Temp Temp Source Heart Rate Resp SpO2 Height Weight   11/28/22 1437 11/28/22 1437 11/28/22 1437 11/28/22 1437 11/28/22 1437 11/28/22 1437 -- 11/28/22 1454   (!) 178/107 97.4 °F (36.3 °C) Oral 69 16 99 %  215 lb (97.5 kg)         Constitutional:  Alert, development consistent with age. HEENT:  NC/NT. Airway patent. Neck:  Normal ROM. Supple. Physical Exam  Right Upper Extremity(s): upper arm and shoulder. Tenderness:  mild. Swelling: None. Deformity: no deformity observed/palpated. ROM: diminished range of motion, diminished range with pain. Skin:  no wounds, erythema, or swelling. Pain noted at the bicep insertion site as well as the [facility] joint. Patient can supinate and pronate. Patient has good  strength. Patient has pain with abduction to about 30 degrees. Neurovascular: Motor deficit: none. Sensory deficit: none. Pulse deficit: none. Capillary refill: normal.  Lymphatics: No lymphangitis or adenopathy noted. Neurological:  Oriented. Motor functions intact. Lab / Imaging Results   (All laboratory and radiology results have been personally reviewed by myself)  Labs:  No results found for this visit on 11/28/22. Imaging:   All Radiology results interpreted by Radiologist unless otherwise noted. No orders to display     ED Course / Medical Decision Making     Medications   ketorolac (TORADOL) injection 30 mg (has no administration in time range)   dexamethasone (DECADRON) injection 10 mg (has no administration in time range)        Consult:   None    Procedure(s):  None    MDM:    Is a 15-year-old presenting with a chronic injury of his right shoulder. Patient is already in pain management. Patient denies any falls or trauma. Patient states has been going on 3 months. Patient denies any other radiation of any pain. Patient denies any paresthesias. Patient denies any weakness. Thorough examination completed. No imaging or lab work done to interpret. Imaging was obtained based on moderate suspicion for fracture / bony abnormality, dislocation, retained foreign body, joint effusion as per history/physical findings. Patient was given a shot of Toradol and Decadron while here. Patient was educated that he will be sent home on naproxen to alternate with Tylenol every 6-8 hours with food. Patient was told that he should avoid using the heating pad. Patient was educated that to use ice 20 minutes on 20 minutes off. Patient was advised to follow-up with his pain management doctor as well as orthopedic specialist.  Patient was advised that since he has been given the recommendation for surgery he may need surgery to fix this. Patient was told to continue with physical therapy. Vitals have remained stable. Differentials include rotator cuff strain/tear, bicep tendon tear, muscle strain, shoulder strain. Patient will follow-up as an outpatient. Patient is neurovascular and sensory intact and has no evidence of compartment syndrome or cellulitis. Patient has a good radial pulse.   Patient is vitally stable and ready for outpatient follow-up  Plan is subsequently for symptom control, limited use and immobilization with outpatient follow-up with pcp as instructed in d/c instructions. Patient was explicitly instructed on specific signs and symptoms on which to return to the emergency room for. Patient was instructed to return to the ER for any new or worsening symptoms. Additional discharge instructions were given verbally. All questions were answered. Patient is comfortable and agreeable with discharge plan. Patient in no acute distress and non-toxic in appearance. Plan of Care/Counseling:  Sandovla Somers PA-C reviewed today's visit with the patient in addition to providing specific details for the plan of care and counseling regarding the diagnosis and prognosis. Questions are answered at this time and are agreeable with the plan. Assessment      1. Chronic right shoulder pain      Plan   Discharged home. Patient condition is stable    New Medications     New Prescriptions    NAPROXEN (NAPROSYN) 500 MG TABLET    Take 1 tablet by mouth 2 times daily for 7 days Please alternate with Tylenol 500 mg every 6-8 hours with food    PREDNISONE (DELTASONE) 20 MG TABLET    Take 1 tablet by mouth 2 times daily for 5 days     Electronically signed by Sandoval Somers PA-C   DD: 11/28/22  **This report was transcribed using voice recognition software. Every effort was made to ensure accuracy; however, inadvertent computerized transcription errors may be present.   END OF ED PROVIDER NOTE      Sandoval Somers PA-C  11/28/22 3212

## 2022-11-28 NOTE — ED NOTES
NP notified of VS for discharge. States ok for discharge.      Jacqueline Matthews RN  11/28/22 1120

## 2022-12-03 ENCOUNTER — HOSPITAL ENCOUNTER (EMERGENCY)
Age: 72
Discharge: HOME | End: 2022-12-03
Attending: EMERGENCY MEDICINE
Payer: MEDICARE

## 2022-12-03 VITALS
TEMPERATURE: 97.6 F | SYSTOLIC BLOOD PRESSURE: 190 MMHG | OXYGEN SATURATION: 97 % | RESPIRATION RATE: 20 BRPM | DIASTOLIC BLOOD PRESSURE: 105 MMHG | HEART RATE: 66 BPM

## 2022-12-03 DIAGNOSIS — G89.29 CHRONIC RIGHT SHOULDER PAIN: Primary | ICD-10-CM

## 2022-12-03 DIAGNOSIS — M25.511 CHRONIC RIGHT SHOULDER PAIN: Primary | ICD-10-CM

## 2022-12-03 PROCEDURE — 6360000002 HC RX W HCPCS: Performed by: EMERGENCY MEDICINE

## 2022-12-03 PROCEDURE — 99284 EMERGENCY DEPT VISIT MOD MDM: CPT

## 2022-12-03 PROCEDURE — 96372 THER/PROPH/DIAG INJ SC/IM: CPT

## 2022-12-03 RX ORDER — CYCLOBENZAPRINE HCL 10 MG
10 TABLET ORAL 3 TIMES DAILY PRN
Qty: 21 TABLET | Refills: 0 | Status: SHIPPED | OUTPATIENT
Start: 2022-12-03 | End: 2022-12-13

## 2022-12-03 RX ORDER — KETOROLAC TROMETHAMINE 30 MG/ML
30 INJECTION, SOLUTION INTRAMUSCULAR; INTRAVENOUS ONCE
Status: COMPLETED | OUTPATIENT
Start: 2022-12-03 | End: 2022-12-03

## 2022-12-03 RX ORDER — ORPHENADRINE CITRATE 30 MG/ML
60 INJECTION INTRAMUSCULAR; INTRAVENOUS ONCE
Status: COMPLETED | OUTPATIENT
Start: 2022-12-03 | End: 2022-12-03

## 2022-12-03 RX ADMIN — KETOROLAC TROMETHAMINE 30 MG: 30 INJECTION, SOLUTION INTRAMUSCULAR at 20:34

## 2022-12-03 RX ADMIN — ORPHENADRINE CITRATE 60 MG: 30 INJECTION INTRAMUSCULAR; INTRAVENOUS at 20:33

## 2022-12-03 ASSESSMENT — PAIN DESCRIPTION - ORIENTATION: ORIENTATION: RIGHT

## 2022-12-03 ASSESSMENT — PAIN SCALES - GENERAL: PAINLEVEL_OUTOF10: 10

## 2022-12-03 ASSESSMENT — PAIN DESCRIPTION - LOCATION: LOCATION: ARM

## 2022-12-03 ASSESSMENT — PAIN - FUNCTIONAL ASSESSMENT: PAIN_FUNCTIONAL_ASSESSMENT: 0-10

## 2022-12-04 NOTE — ED PROVIDER NOTES
HPI:  12/3/22,   Time: 8:20 PM GETACHEW Galeas is a 74 y.o. male presenting to the ED for right shoulder pain, beginning 4 months ago. The complaint has been persistent, moderate in severity, and worsened by movement of right shoulder. No alleviating factors. Patient says that he tore a muscle in the right shoulder in August. He's followed up with Dr. Sherri Yañez, orthopedic surgery. He's had a MRI of the shoulder that showed a torn bicep muscle. Says that Dr. Sherri Yañez gave him an injection into the shoulder previously that helped with his pain but the pain keeps coming back. He's following up now with Dr. Nicol Belle to pursue surgery. He was just seen in the ED 5 days ago for similar. Was prescribed naproxen and prednisone. Says that his is causing constipation so he stopped the medications. Denies any new trauma or injury. Denies chest pain, shortness of breath, dizziness, neck pain, chest pain, back pain. ROS:   Pertinent positives and negatives are stated within HPI, all other systems reviewed and are negative.  --------------------------------------------- PAST HISTORY ---------------------------------------------  Past Medical History:  has a past medical history of Hyperlipidemia and Hypertension. Past Surgical History:  has a past surgical history that includes Cardiac surgery. Social History:  reports that he has never smoked. He has never used smokeless tobacco. He reports that he does not currently use alcohol. He reports that he does not use drugs. Family History: family history is not on file. The patients home medications have been reviewed. Allergies: Patient has no known allergies. -------------------------------------------------- RESULTS -------------------------------------------------  All laboratory and radiology results have been personally reviewed by myself   LABS:  No results found for this visit on 12/03/22.     RADIOLOGY:  Interpreted by Radiologist.  No orders to display       ------------------------- NURSING NOTES AND VITALS REVIEWED ---------------------------   The nursing notes within the ED encounter and vital signs as below have been reviewed. BP (!) 178/104   Pulse 66   Temp 97.6 °F (36.4 °C) (Oral)   Resp 20   SpO2 97%   Oxygen Saturation Interpretation: Normal      ---------------------------------------------------PHYSICAL EXAM--------------------------------------      Constitutional/General: Alert and oriented x3, well appearing, non toxic in NAD  Head: NC/AT  Eyes: PERRL, EOMI  Mouth: Oropharynx clear, handling secretions, no trismus  Neck: Supple, full ROM, no meningeal signs  Pulmonary: Lungs clear to auscultation bilaterally, no wheezes, rales, or rhonchi. Not in respiratory distress  Cardiovascular:  Regular rate and rhythm, no murmurs, gallops, or rubs. 2+ distal pulses  Extremities: Decreased ROM of the right upper extremity secondary to pain. Mild tenderness to palpation over the [facility] joint and bicep muscle. No deformity appreciated. Neurovascular intact. Compartments soft. Skin: warm and dry without rash  Neurologic: GCS 15,  Psych: Normal Affect      ------------------------------ ED COURSE/MEDICAL DECISION MAKING----------------------  Medications   ketorolac (TORADOL) injection 30 mg (30 mg IntraMUSCular Given 12/3/22 2034)   orphenadrine (NORFLEX) injection 60 mg (60 mg IntraMUSCular Given 12/3/22 2033)         Medical Decision Making:    XR obtained on 11/16/22. No acute fracture was appreciated. Degenerative changes seen. Patient denies any new trauma or injury. Says the pain started up again when he stopped the naproxen. Pain has been ongoing since August. Patient is neurovascular intact. Compartments soft. Mild tenderness to palpation over the [facility] joint and bicep muscle. Decreased ROM secondary to pain. He was given Toradol and norflex in the ED. I will discharge home with a prescription for flexeril.  I told him to take tylenol/motrin as needed for pain. I told him to follow up with Dr. Ida Sheets this week and to return for worsening symptoms. He's agreeable with the plan of care. Counseling: The emergency provider has spoken with the patient and discussed todays results, in addition to providing specific details for the plan of care and counseling regarding the diagnosis and prognosis. Questions are answered at this time and they are agreeable with the plan.      --------------------------------- IMPRESSION AND DISPOSITION ---------------------------------    IMPRESSION  1.  Chronic right shoulder pain        DISPOSITION  Disposition: Discharge to home  Patient condition is stable                  Bowen Jackson MD  12/03/22 2037

## 2022-12-12 ENCOUNTER — HOSPITAL ENCOUNTER (EMERGENCY)
Age: 72
Discharge: LEFT BEFORE DISCHARGE | End: 2022-12-12

## 2022-12-12 VITALS — OXYGEN SATURATION: 100 % | TEMPERATURE: 97.7 F | HEART RATE: 77 BPM

## 2022-12-22 ENCOUNTER — TELEPHONE (OUTPATIENT)
Dept: INTERNAL MEDICINE CLINIC | Age: 72
End: 2022-12-22

## 2022-12-22 RX ORDER — SERTRALINE HYDROCHLORIDE 25 MG/1
25 TABLET, FILM COATED ORAL DAILY
Qty: 90 TABLET | Refills: 1 | Status: SHIPPED | OUTPATIENT
Start: 2022-12-22

## 2022-12-22 RX ORDER — SERTRALINE HYDROCHLORIDE 25 MG/1
25 TABLET, FILM COATED ORAL DAILY
COMMUNITY
Start: 2022-11-04 | End: 2022-12-22 | Stop reason: SDUPTHER

## 2022-12-22 NOTE — TELEPHONE ENCOUNTER
----- Message from [facility] sent at 12/22/2022  2:40 PM EST -----  Subject: Refill Request    QUESTIONS  Name of Medication? Other - Sertraline 25MG tablet   Patient-reported dosage and instructions? 25MG Tablet; Take one tablet by   mouth daily   How many days do you have left? 3  Preferred Pharmacy? [address] [place] [address]  Pharmacy phone number (if available)? [phone]  Additional Information for Provider? Pt will be out of medication in 3   days would like to have refilled. ---------------------------------------------------------------------------  --------------  Trupti Francois INFO  What is the best way for the office to contact you? OK to leave message on   voicemail  Preferred Call Back Phone Number? [phone]  ---------------------------------------------------------------------------  --------------  SCRIPT ANSWERS  Relationship to Patient?  Self

## 2022-12-27 ENCOUNTER — OFFICE VISIT (OUTPATIENT)
Dept: ORTHOPEDIC SURGERY | Age: 72
End: 2022-12-27
Payer: MEDICARE

## 2022-12-27 VITALS — BODY MASS INDEX: 29.12 KG/M2 | HEIGHT: 72 IN | TEMPERATURE: 98 F | WEIGHT: 215 LBS

## 2022-12-27 DIAGNOSIS — S46.011A TRAUMATIC COMPLETE TEAR OF RIGHT ROTATOR CUFF, INITIAL ENCOUNTER: Primary | ICD-10-CM

## 2022-12-27 PROCEDURE — 3017F COLORECTAL CA SCREEN DOC REV: CPT | Performed by: ORTHOPAEDIC SURGERY

## 2022-12-27 PROCEDURE — G8427 DOCREV CUR MEDS BY ELIG CLIN: HCPCS | Performed by: ORTHOPAEDIC SURGERY

## 2022-12-27 PROCEDURE — G8417 CALC BMI ABV UP PARAM F/U: HCPCS | Performed by: ORTHOPAEDIC SURGERY

## 2022-12-27 PROCEDURE — 1123F ACP DISCUSS/DSCN MKR DOCD: CPT | Performed by: ORTHOPAEDIC SURGERY

## 2022-12-27 PROCEDURE — 1036F TOBACCO NON-USER: CPT | Performed by: ORTHOPAEDIC SURGERY

## 2022-12-27 PROCEDURE — G8484 FLU IMMUNIZE NO ADMIN: HCPCS | Performed by: ORTHOPAEDIC SURGERY

## 2022-12-27 PROCEDURE — 99203 OFFICE O/P NEW LOW 30 MIN: CPT | Performed by: ORTHOPAEDIC SURGERY

## 2022-12-27 NOTE — PROGRESS NOTES
Chief Complaint   Patient presents with    Shoulder Pain     Right shoulder pain since august. Received cortisone injection from Dr. Delmi Olmedo about a month ago. Did not get much relief from injection. Had some Pt on shoulder as well. Has had two injures to shoulders he believes. Mia Neff is a 74y.o. year old   male who is seen today  for evaluation of right shoulder pain. He reports the pain has been ongoing for the past 5 months. He does recall a specific injury which started the pain. He states he was pulling a marcia when he felt a pop. He reports the pain is worse with activity, better with rest.  The patient does have mechanical symptoms. Hedoes have night pain. He denies a feeling of instability. The prior treatments have been none. The patient   has not responded to the treatment. The patient is right hand dominant. The patient is working. 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 Chief Complaint   Patient presents with    Shoulder Pain     Right shoulder pain since august. Received cortisone injection from Dr. Delmi Olmedo about a month ago. Did not get much relief from injection. Had some Pt on shoulder as well. Has had two injures to shoulders he believes. Past Medical History:   Diagnosis Date    Hyperlipidemia     Hypertension      Past Surgical History:   Procedure Laterality Date    CARDIAC SURGERY         Current Outpatient Medications:     sertraline (ZOLOFT) 25 MG tablet, Take 1 tablet by mouth daily, Disp: 90 tablet, Rfl: 1    gabapentin (NEURONTIN) 600 MG tablet, Take 600 mg by mouth in the morning and 600 mg at noon and 600 mg before bedtime. , Disp: , Rfl:     acetaminophen (TYLENOL) 500 MG tablet, Take 2 tablets by mouth 3 times daily as needed for Pain or Fever, Disp: 42 tablet, Rfl: 0    carvedilol (COREG) 12.5 MG tablet, Take 12.5 mg by mouth 2 times daily (with meals), Disp: , Rfl:     aspirin 81 MG tablet, Take 81 mg by mouth daily. , Disp: , Rfl: amLODIPine-benazepril (LOTREL) 5-40 MG per capsule, Take 1 capsule by mouth daily. , Disp: , Rfl:     atorvastatin (LIPITOR) 20 MG tablet, Take 20 mg by mouth daily. , Disp: , Rfl:     omega-3 acid ethyl esters (LOVAZA) 1 g capsule, Take 1 g by mouth daily. , Disp: , Rfl:   No Known Allergies  Social History     Socioeconomic History    Marital status:      Spouse name: Not on file    Number of children: Not on file    Years of education: Not on file    Highest education level: Not on file   Occupational History    Not on file   Tobacco Use    Smoking status: Never    Smokeless tobacco: Never   Substance and Sexual Activity    Alcohol use: Not Currently     Comment: Wine    Drug use: No    Sexual activity: Not Currently   Other Topics Concern    Not on file   Social History Narrative    Not on file     Social Determinants of Health     Financial Resource Strain: Low Risk     Difficulty of Paying Living Expenses: Not hard at all   Food Insecurity: No Food Insecurity    Worried About Running Out of Food in the Last Year: Never true    Ran Out of Food in the Last Year: Never true   Transportation Needs: Not on file   Physical Activity: Not on file   Stress: Not on file   Social Connections: Not on file   Intimate Partner Violence: Not on file   Housing Stability: Not on file     No family history on file. REVIEW OF SYSTEMS:     General/Constitution:  (-)weight loss, (-)fever, (-)chills, (-)weakness. Skin: (-) rash,(-) psoriasis,(-) eczema, (-)skin cancer. Musculoskeletal: (-) fractures,  (-) dislocations,(-) collagen vascular disease, (-) fibromyalgia, (-) multiple sclerosis, (-) muscular dystrophy, (-) RSD,(-) joint pain (-)swelling, (-) joint pain,swelling. Neurologic: (-) epilepsy, (-)seizures,(-) brain tumor,(-) TIA, (-)stroke, (-)headaches, (-)Parkinson disease,(-) memory loss, (-) LOC.   Cardiovascular: (-) Chest pain, (-) swelling in legs/feet, (-) SOB, (-) cramping in legs/feet with walking. Respiratory: (-) SOB, (-) Coughing, (-) night sweats. GI: (-) nausea, (-) vomiting, (-) diarrhea, (-) blood in stool, (-) gastric ulcer. Psychiatric: (-) Depression, (-) Anxiety, (-) bipolar disease, (-) Alzheimer's Disease  Allergic/Immunologic: (-) allergies latex, (-) allergies metal, (-) skin sensitivity. Hematlogic: (-) anemia, (-) blood transfusion, (-) DVT/PE, (-) Clotting disorders      Subjective:    Constitution:  Temp 98 °F (36.7 °C)   Ht 6' (1.829 m)   Wt 215 lb (97.5 kg)   BMI 29.16 kg/m²     Psycihatric:  The patient is alert and oriented x 3, appears to be stated age and in no distress. Respiratory:  Respiratory effort is not labored. Patient is not gasping. Palpation of the chest reveals no tactile fremitus. Skin:  Upon inspection: the skin appears warm, dry and intact. There is not a previous scar over the affected area. There is not any cellulitis, lymphedema or cutaneous lesions noted in the lower extremities. Upon palpation there is no induration noted. Neurologic:  Motor exam of the upper extremities show: The reflexes in biceps/triceps/brachioradialis are equal and symmetric. Sensory exam C5-T1 are normal bilaterally. Cardiovascular: The vascular exam is normal and is well perfused to distal extremities. There are 2+ radial pulses bilaterally, and motor and sensation is intact to median, ulnar, and radial, musclocutaneus, and axillary nerve distribution and grossly symmetric bilaterally. There is cap refill noted less than two seconds in all digits. There is not edema of the bilateral upper extremities. There is not varicosities noted in the distal extremities. Lymph:  Upon palpation,  there is no lymphadenopathy noted in bilateral upper extremities. Musculoskeletal:  Gait: normal; examination of the nails and digits reveal no cyanosis or clubbing.       Cervical Exam:  On physical exam, Negro Antony is well-developed, well-nourished, oriented to person, place and time. his gait is normal.  On evaluation of hiscervical spine, He has full range of motion of the cervical spine without pain. There is no cervical tenderness to palpation. Shoulder Exam:  On evaluation of his bilaterally upper extremities, his right shoulder has no deformity. There is tenderness upon palpation of the anterior and lateral.  There is not evidence of scapular dyskinesis. There is not muscle atrophy in shoulder girdle. The range of motion for the Right Shoulder is 115/20/bl and for the Left shoulder is 140/40/t10. Right shoulder Motor strength is -5/5 in the supraspinatus, 5/5 internal rotation and -5/5 in external rotation, and Left shoulder motor strength 5/5 in supraspinatus, 5/5 in internal rotation, 5/5 in external rotation. Right shoulder:  positive Impingement , positive Chavez ,positive  Speeds,negative  Apprehension ,negative Barrett Load Shift, negative Jacob manuver, negative Cross arm test.     Left shoulder:  negative Impingement , negative Chavez ,negative  Speeds,negative  Apprehension ,negative Barrett Load Shift, negative Jacob manuver, negative Cross arm test.     XRAY:    Impression   1. Decreased osseous mineralization. No acute fracture about the right   shoulder. Inferior subluxation of the humeral head. 2.  Mild to moderate right shoulder degenerative changes. 3.  Atherosclerotic disease and postsurgical changes over the mediastinum. MRI:    Complete RTC tear with retraction of the supraspinatus and infraspinatus mid humeral head. Radiographic findings reviewed with patient    Impression:   Encounter Diagnosis   Name Primary? Traumatic complete tear of right rotator cuff, initial encounter Yes       Plan: Natural history and expected course discussed. Questions answered. Educational material distributed. Reduction in offending activity. Gentle ROM exercises  RICE therapy.      I had a lengthy discussion with the patient regarding their diagnosis. I explained treatment options including surgical vs non surgical treatment. I reviewed in detail the risks and benefits and outlined the procedure in detail with expected outcomes and possible complications. I also discussed non surgical treatment such as injections (CSI and visco supplementation), physical therapy, topical creams and NSAID's. They have elected for surgical management at this time. Patient understands that this needs fixed soon and will  see his PCP and hematologist due to his factor 8 blood clotting disorder.

## 2023-02-17 LAB
APTT: 43.4 SEC (ref 24.5–35.1)
INR BLD: 1.1
PROTHROMBIN TIME: 12 SEC (ref 9.3–12.4)

## 2023-02-22 ENCOUNTER — HOSPITAL ENCOUNTER (OUTPATIENT)
Age: 73
Discharge: HOME | End: 2023-02-22

## 2023-02-23 ENCOUNTER — HOSPITAL ENCOUNTER (OUTPATIENT)
Age: 73
Discharge: HOME | End: 2023-02-23

## 2023-03-07 ENCOUNTER — HOSPITAL ENCOUNTER (OUTPATIENT)
Age: 73
Discharge: HOME | End: 2023-03-07
Payer: MEDICARE

## 2023-03-07 LAB
Lab: NORMAL
THIS TEST SENT TO: NORMAL

## 2023-03-07 PROCEDURE — 85576 BLOOD PLATELET AGGREGATION: CPT

## 2023-03-22 LAB
Lab: NORMAL
REPORT: NORMAL
THIS TEST SENT TO: NORMAL

## 2024-05-29 ENCOUNTER — HOSPITAL ENCOUNTER (OUTPATIENT)
Age: 74
Discharge: HOME | End: 2024-05-29
Payer: MEDICARE

## 2024-05-29 LAB — PSA SERPL-MCNC: 1.09 NG/ML (ref 0–4)

## 2024-05-29 PROCEDURE — G0103 PSA SCREENING: HCPCS

## 2024-05-29 PROCEDURE — 36415 COLL VENOUS BLD VENIPUNCTURE: CPT

## 2025-05-22 ENCOUNTER — HOSPITAL ENCOUNTER (OUTPATIENT)
Age: 75
Discharge: HOME | End: 2025-05-22
Payer: MEDICARE

## 2025-05-22 LAB
25(OH)D3 SERPL-MCNC: 29.5 NG/ML (ref 30–100)
ALBUMIN SERPL-MCNC: 4.1 G/DL (ref 3.5–5.2)
ALP SERPL-CCNC: 68 U/L (ref 40–129)
ALT SERPL-CCNC: 18 U/L (ref 0–40)
ANION GAP SERPL CALCULATED.3IONS-SCNC: 9 MMOL/L (ref 7–16)
AST SERPL-CCNC: 15 U/L (ref 0–39)
BILIRUB SERPL-MCNC: 0.8 MG/DL (ref 0–1.2)
BUN SERPL-MCNC: 23 MG/DL (ref 6–23)
CALCIUM SERPL-MCNC: 9.3 MG/DL (ref 8.6–10.2)
CHLORIDE SERPL-SCNC: 101 MMOL/L (ref 98–107)
CHOLEST SERPL-MCNC: 128 MG/DL
CO2 SERPL-SCNC: 27 MMOL/L (ref 22–29)
CREAT SERPL-MCNC: 1.2 MG/DL (ref 0.7–1.2)
GFR, ESTIMATED: 64 ML/MIN/1.73M2
GLUCOSE SERPL-MCNC: 119 MG/DL (ref 74–99)
HDLC SERPL-MCNC: 40 MG/DL
LDLC SERPL CALC-MCNC: 68 MG/DL
POTASSIUM SERPL-SCNC: 4.3 MMOL/L (ref 3.5–5)
PROT SERPL-MCNC: 6.6 G/DL (ref 6.4–8.3)
SODIUM SERPL-SCNC: 137 MMOL/L (ref 132–146)
TRIGL SERPL-MCNC: 99 MG/DL
VLDLC SERPL CALC-MCNC: 20 MG/DL

## 2025-05-22 PROCEDURE — 80061 LIPID PANEL: CPT

## 2025-05-22 PROCEDURE — 36415 COLL VENOUS BLD VENIPUNCTURE: CPT

## 2025-05-22 PROCEDURE — 80053 COMPREHEN METABOLIC PANEL: CPT

## 2025-05-22 PROCEDURE — 82306 VITAMIN D 25 HYDROXY: CPT
